# Patient Record
Sex: MALE | Race: BLACK OR AFRICAN AMERICAN | NOT HISPANIC OR LATINO | Employment: OTHER | URBAN - METROPOLITAN AREA
[De-identification: names, ages, dates, MRNs, and addresses within clinical notes are randomized per-mention and may not be internally consistent; named-entity substitution may affect disease eponyms.]

---

## 2023-07-10 ENCOUNTER — OFFICE VISIT (OUTPATIENT)
Dept: PRIMARY CARE | Facility: CLINIC | Age: 73
End: 2023-07-10
Payer: COMMERCIAL

## 2023-07-10 VITALS
HEIGHT: 67 IN | BODY MASS INDEX: 43.47 KG/M2 | SYSTOLIC BLOOD PRESSURE: 130 MMHG | OXYGEN SATURATION: 93 % | DIASTOLIC BLOOD PRESSURE: 84 MMHG | WEIGHT: 277 LBS | HEART RATE: 61 BPM

## 2023-07-10 DIAGNOSIS — I10 BENIGN ESSENTIAL HYPERTENSION: ICD-10-CM

## 2023-07-10 DIAGNOSIS — M10.00 IDIOPATHIC GOUT, UNSPECIFIED CHRONICITY, UNSPECIFIED SITE: ICD-10-CM

## 2023-07-10 DIAGNOSIS — G56.01 CARPAL TUNNEL SYNDROME OF RIGHT WRIST: Primary | ICD-10-CM

## 2023-07-10 DIAGNOSIS — E66.01 MORBID OBESITY (MULTI): ICD-10-CM

## 2023-07-10 DIAGNOSIS — E11.9 TYPE 2 DIABETES MELLITUS WITHOUT COMPLICATION, WITHOUT LONG-TERM CURRENT USE OF INSULIN (MULTI): ICD-10-CM

## 2023-07-10 PROBLEM — M19.90 OSTEOARTHROSIS: Status: ACTIVE | Noted: 2023-07-10

## 2023-07-10 PROBLEM — E78.5 HYPERLIPIDEMIA: Status: ACTIVE | Noted: 2023-07-10

## 2023-07-10 PROBLEM — M10.9 GOUT: Status: ACTIVE | Noted: 2023-07-10

## 2023-07-10 PROBLEM — G47.30 SLEEP APNEA: Status: ACTIVE | Noted: 2023-07-10

## 2023-07-10 PROCEDURE — 99213 OFFICE O/P EST LOW 20 MIN: CPT | Performed by: FAMILY MEDICINE

## 2023-07-10 PROCEDURE — 3079F DIAST BP 80-89 MM HG: CPT | Performed by: FAMILY MEDICINE

## 2023-07-10 PROCEDURE — 1036F TOBACCO NON-USER: CPT | Performed by: FAMILY MEDICINE

## 2023-07-10 PROCEDURE — 1160F RVW MEDS BY RX/DR IN RCRD: CPT | Performed by: FAMILY MEDICINE

## 2023-07-10 PROCEDURE — 3075F SYST BP GE 130 - 139MM HG: CPT | Performed by: FAMILY MEDICINE

## 2023-07-10 PROCEDURE — 1159F MED LIST DOCD IN RCRD: CPT | Performed by: FAMILY MEDICINE

## 2023-07-10 RX ORDER — LATANOPROST 50 UG/ML
1 SOLUTION/ DROPS OPHTHALMIC DAILY
COMMUNITY
Start: 2022-08-22

## 2023-07-10 RX ORDER — METFORMIN HYDROCHLORIDE EXTENDED-RELEASE TABLETS 500 MG/1
500 TABLET, FILM COATED, EXTENDED RELEASE ORAL
COMMUNITY
Start: 2022-08-22 | End: 2023-09-07 | Stop reason: DRUGHIGH

## 2023-07-10 RX ORDER — ATENOLOL 50 MG/1
1 TABLET ORAL DAILY
COMMUNITY
Start: 2022-08-22

## 2023-07-10 RX ORDER — ALLOPURINOL 100 MG/1
2 TABLET ORAL DAILY
COMMUNITY
Start: 2022-08-22

## 2023-07-10 RX ORDER — FERROUS SULFATE 324(65)MG
2 TABLET, DELAYED RELEASE (ENTERIC COATED) ORAL DAILY
COMMUNITY
Start: 2022-08-22

## 2023-07-10 RX ORDER — TRIAMTERENE/HYDROCHLOROTHIAZID 37.5-25 MG
0.5 TABLET ORAL DAILY
COMMUNITY
Start: 2022-08-22

## 2023-07-10 RX ORDER — NAPROXEN 500 MG/1
500 TABLET ORAL AS NEEDED
COMMUNITY
End: 2023-11-07 | Stop reason: WASHOUT

## 2023-07-10 RX ORDER — CARBOXYMETHYLCELLULOSE SODIUM 5 MG/ML
2 SOLUTION/ DROPS OPHTHALMIC AS NEEDED
COMMUNITY
Start: 2022-08-22

## 2023-07-10 RX ORDER — ACETAMINOPHEN 500 MG
1 TABLET ORAL DAILY
COMMUNITY
Start: 2022-08-22

## 2023-07-10 RX ORDER — SIMVASTATIN 40 MG/1
40 TABLET, FILM COATED ORAL NIGHTLY
COMMUNITY
Start: 2022-08-22

## 2023-07-10 ASSESSMENT — ENCOUNTER SYMPTOMS
VOMITING: 0
NUMBNESS: 1
CONSTIPATION: 0
DIARRHEA: 0
OCCASIONAL FEELINGS OF UNSTEADINESS: 0
ARTHRALGIAS: 0
CHILLS: 0
CHEST TIGHTNESS: 0
DIZZINESS: 0
ABDOMINAL PAIN: 0
DEPRESSION: 0
COUGH: 0
NAUSEA: 0
HEADACHES: 0
FEVER: 0
APNEA: 0
FATIGUE: 0
SHORTNESS OF BREATH: 0
ACTIVITY CHANGE: 0
ARTHRALGIAS: 1
WHEEZING: 0
LOSS OF SENSATION IN FEET: 0
WEAKNESS: 0
NERVOUS/ANXIOUS: 0
CONFUSION: 0
RESPIRATORY NEGATIVE: 1
PALPITATIONS: 0

## 2023-07-10 ASSESSMENT — PATIENT HEALTH QUESTIONNAIRE - PHQ9
SUM OF ALL RESPONSES TO PHQ9 QUESTIONS 1 AND 2: 2
10. IF YOU CHECKED OFF ANY PROBLEMS, HOW DIFFICULT HAVE THESE PROBLEMS MADE IT FOR YOU TO DO YOUR WORK, TAKE CARE OF THINGS AT HOME, OR GET ALONG WITH OTHER PEOPLE: SOMEWHAT DIFFICULT
1. LITTLE INTEREST OR PLEASURE IN DOING THINGS: SEVERAL DAYS
2. FEELING DOWN, DEPRESSED OR HOPELESS: SEVERAL DAYS

## 2023-07-10 NOTE — PROGRESS NOTES
Subjective   Patient ID: Du Herrera is a 72 y.o. male who presents for No chief complaint on file..    HPI     Review of Systems   Constitutional:  Negative for activity change, chills, fatigue and fever.   Eyes:  Negative for visual disturbance.   Respiratory: Negative.  Negative for apnea, cough and wheezing.    Cardiovascular:  Negative for chest pain and palpitations.   Gastrointestinal:  Negative for abdominal pain, constipation, diarrhea, nausea and vomiting.   Musculoskeletal:  Positive for arthralgias.   Neurological:  Negative for dizziness, weakness and headaches.   Psychiatric/Behavioral:  Negative for confusion. The patient is not nervous/anxious.        Objective   There were no vitals taken for this visit.    Physical Exam    Assessment/Plan

## 2023-07-10 NOTE — PROGRESS NOTES
"Subjective   Patient ID: Du Herrera is a 72 y.o. male who presents for No chief complaint on file..    HPI patient today for follow-up of ongoing healthcare issues.  [facility] but does not have results with him.  Also believes he has had a [facility] and will get that report to us.  He is complaining of some problems with right upper extremity weakness numbness tingling.  He is right-hand dominant.  Symptoms have been going on over the past several months.  Cannot wait pinpoint a definitive trigger.    Review of Systems   Constitutional:  Negative for chills and fever.   HENT:  Negative for congestion and ear pain.    Eyes:  Negative for visual disturbance.   Respiratory:  Negative for chest tightness and shortness of breath.    Cardiovascular:  Negative for chest pain and palpitations.   Gastrointestinal:  Negative for abdominal pain.   Musculoskeletal:  Negative for arthralgias.   Skin:  Negative for pallor.   Neurological:  Positive for numbness.   Psychiatric/Behavioral:  Negative for confusion.        Objective   /87   Pulse 61   Ht 1.702 m (5' 7\")   Wt 126 kg (277 lb)   SpO2 93%   BMI 43.38 kg/m²     Physical Exam  Vitals and nursing note reviewed.   Constitutional:       General: He is not in acute distress.     Appearance: Normal appearance. He is not ill-appearing.   HENT:      Head: Normocephalic and atraumatic.      Right Ear: Tympanic membrane, ear canal and external ear normal.      Left Ear: Tympanic membrane, ear canal and external ear normal.      Mouth/Throat:      Pharynx: Oropharynx is clear.   Eyes:      Extraocular Movements: Extraocular movements intact.   Cardiovascular:      Rate and Rhythm: Normal rate and regular rhythm.      Pulses: Normal pulses.      Heart sounds: Normal heart sounds.   Pulmonary:      Effort: Pulmonary effort is normal.      Breath sounds: Normal breath sounds.   Abdominal:      General: Abdomen " is flat. Bowel sounds are normal.      Palpations: Abdomen is soft.      Tenderness: There is no abdominal tenderness.   Musculoskeletal:         General: Normal range of motion.      Cervical back: Neck supple.      Comments: Positive Tinel sign right wrist.   Skin:     General: Skin is warm.   Neurological:      Mental Status: He is alert and oriented to person, place, and time. Mental status is at baseline.   Psychiatric:         Mood and Affect: Mood normal.     Patient to obtain copies of recent lab results as well [facility] and bring that to our office    Right upper extremity EMG further plans pending those results    Return to our office in 8 weeks to reassess his case    Assessment/Plan   Problem List Items Addressed This Visit       Benign essential hypertension     Continue current medication         Relevant Orders    Follow Up In Primary Care - Established    Gout     Continue current medication         Morbid obesity (CMS/[facility])     Reviewed dietary modifications with regards to calorie counting and sugar content as well as fat content.         Carpal tunnel syndrome of right wrist - Primary    Relevant Orders    EMG & nerve conduction    Follow Up In Primary Care - Established    Type 2 diabetes mellitus without complication, without long-term current use of insulin (CMS/HCC)     [facility] patient currently [facility]

## 2023-07-10 NOTE — ASSESSMENT & PLAN NOTE
Reviewed dietary modifications with regards to calorie counting and sugar content as well as fat content.

## 2023-07-21 DIAGNOSIS — G56.01 CARPAL TUNNEL SYNDROME OF RIGHT WRIST: ICD-10-CM

## 2023-09-07 ENCOUNTER — OFFICE VISIT (OUTPATIENT)
Dept: PRIMARY CARE | Facility: CLINIC | Age: 73
End: 2023-09-07
Payer: COMMERCIAL

## 2023-09-07 VITALS
HEART RATE: 57 BPM | OXYGEN SATURATION: 95 % | DIASTOLIC BLOOD PRESSURE: 77 MMHG | SYSTOLIC BLOOD PRESSURE: 128 MMHG | TEMPERATURE: 98.8 F | HEIGHT: 67 IN | WEIGHT: 266.4 LBS | BODY MASS INDEX: 41.81 KG/M2

## 2023-09-07 DIAGNOSIS — G56.21 ULNAR NEUROPATHY AT ELBOW, RIGHT: ICD-10-CM

## 2023-09-07 DIAGNOSIS — M54.12 CERVICAL RADICULOPATHY AT C7: ICD-10-CM

## 2023-09-07 DIAGNOSIS — E61.1 IRON DEFICIENCY: ICD-10-CM

## 2023-09-07 DIAGNOSIS — M54.12 CERVICAL RADICULOPATHY AT C8: ICD-10-CM

## 2023-09-07 DIAGNOSIS — G56.01 CARPAL TUNNEL SYNDROME OF RIGHT WRIST: ICD-10-CM

## 2023-09-07 DIAGNOSIS — E78.2 MIXED HYPERLIPIDEMIA: ICD-10-CM

## 2023-09-07 DIAGNOSIS — E11.9 TYPE 2 DIABETES MELLITUS WITHOUT COMPLICATION, WITHOUT LONG-TERM CURRENT USE OF INSULIN (MULTI): Primary | ICD-10-CM

## 2023-09-07 DIAGNOSIS — I10 BENIGN ESSENTIAL HYPERTENSION: ICD-10-CM

## 2023-09-07 PROCEDURE — 1160F RVW MEDS BY RX/DR IN RCRD: CPT | Performed by: FAMILY MEDICINE

## 2023-09-07 PROCEDURE — 1036F TOBACCO NON-USER: CPT | Performed by: FAMILY MEDICINE

## 2023-09-07 PROCEDURE — 99214 OFFICE O/P EST MOD 30 MIN: CPT | Performed by: FAMILY MEDICINE

## 2023-09-07 PROCEDURE — 1159F MED LIST DOCD IN RCRD: CPT | Performed by: FAMILY MEDICINE

## 2023-09-07 PROCEDURE — 3078F DIAST BP <80 MM HG: CPT | Performed by: FAMILY MEDICINE

## 2023-09-07 PROCEDURE — 3074F SYST BP LT 130 MM HG: CPT | Performed by: FAMILY MEDICINE

## 2023-09-07 RX ORDER — GABAPENTIN 300 MG/1
300 CAPSULE ORAL 2 TIMES DAILY
Qty: 60 CAPSULE | Refills: 2 | Status: SHIPPED | OUTPATIENT
Start: 2023-09-07 | End: 2023-11-07 | Stop reason: WASHOUT

## 2023-09-07 RX ORDER — METFORMIN HYDROCHLORIDE 850 MG/1
850 TABLET ORAL
COMMUNITY

## 2023-09-07 ASSESSMENT — ENCOUNTER SYMPTOMS
FEVER: 0
NECK PAIN: 1
SHORTNESS OF BREATH: 0
CONFUSION: 0
PALPITATIONS: 0
ABDOMINAL PAIN: 0
CHILLS: 0
CHEST TIGHTNESS: 0
ARTHRALGIAS: 0

## 2023-09-07 NOTE — ASSESSMENT & PLAN NOTE
Referral to pain management  Gabapentin 300 mg once at bedtime for the first week then increase to 1 twice a day  X-ray cervical spine

## 2023-09-07 NOTE — PROGRESS NOTES
"Subjective   Patient ID: Du Herrera is a 73 y.o. male who presents for Follow-up (2 month follow up test and discuss right shoulder pain ).    HPI   Patient today for follow-up on ongoing healthcare issues he brings in copies of [facility] as well as his [facility]oscopy.  Also he completed his EMG over the summer and here to review results.  He continues have problems with pain numbness and tingling of varying degrees in the right upper extremity as well as discomfort in the right posterior neck region.  Review of Systems   Constitutional:  Negative for chills and fever.   HENT:  Negative for congestion and ear pain.    Eyes:  Negative for visual disturbance.   Respiratory:  Negative for chest tightness and shortness of breath.    Cardiovascular:  Negative for chest pain and palpitations.   Gastrointestinal:  Negative for abdominal pain.   Musculoskeletal:  Positive for neck pain. Negative for arthralgias.        Right upper extremity numbness tingling and pain   Skin:  Negative for pallor.   Psychiatric/Behavioral:  Negative for confusion.        Objective   /77 (BP Location: Left arm, Patient Position: Sitting)   Pulse 57   Temp 37.1 °C (98.8 °F)   Ht 1.702 m (5' 7\")   Wt 121 kg (266 lb 6.4 oz)   SpO2 95%   BMI 41.72 kg/m²     Physical Exam  Vitals and nursing note reviewed.   Constitutional:       General: He is not in acute distress.     Appearance: Normal appearance. He is not ill-appearing.   HENT:      Head: Normocephalic and atraumatic.      Right Ear: Tympanic membrane, ear canal and external ear normal.      Left Ear: Tympanic membrane, ear canal and external ear normal.      Mouth/Throat:      Pharynx: Oropharynx is clear.   Eyes:      Extraocular Movements: Extraocular movements intact.   Cardiovascular:      Rate and Rhythm: Normal rate and regular rhythm.      Pulses: Normal pulses.      Heart sounds: Normal heart sounds.   Pulmonary:      Effort: Pulmonary effort is normal.     "  Breath sounds: Normal breath sounds.   Abdominal:      General: Abdomen is flat. Bowel sounds are normal.      Palpations: Abdomen is soft.      Tenderness: There is no abdominal tenderness.   Musculoskeletal:         General: Tenderness present. Normal range of motion.      Cervical back: Neck supple.      Comments: Positive tenderness of the paracervical muscles especially on the right side of the neck and into the right trapezius.  Positive Tinel sign at the level of the right wrist as well as the medial right elbow   Skin:     General: Skin is warm.   Neurological:      Mental Status: He is alert and oriented to person, place, and time. Mental status is at baseline.      Motor: No weakness.   Psychiatric:         Mood and Affect: Mood normal.     EMG reviewed with the patient showing right-sided carpal tunnel syndrome right sided ulnar neuropathy.  Also evidence of C7 and C8 cervical radiculopathy  Refer to orthopedics for second opinion on the carpal tunnel and the ulnar neuropathy issues    Refer to pain management  X-ray cervical spine  Trial of gabapentin 300 mg once at bedtime the first week then increase to 1 twice daily  Await further input from specialist diagnostic test and clinical course with regards to decision-making on the cervical radiculopathy    We discussed recommendations with regards to immunizations heading into the fall specifically flu vaccine COVID booster and RSV    Colonoscopy report reviewed with patient he will be due for repeat scope in 5 years from his last scope which would place him into February 2026  Return to office 8 weeks for reassessment    Assessment/Plan   Problem List Items Addressed This Visit       Benign essential hypertension     Stable continue treatment         Relevant Orders    Follow Up In Primary Care - Established    Hyperlipidemia     Stable on recent labs continue Zocor and dietary modifications         Carpal tunnel syndrome of right wrist     Refer to  orthopedics for second opinion on possible surgical intervention         Relevant Orders    Referral to Orthopaedic Surgery    Follow Up In Primary Care - Established    Type 2 diabetes mellitus without complication, without long-term current use of insulin (CMS/[facility]) - Primary     A1c 6.4% patient states [facility] has him on [facility] 850 mg once a day         Ulnar neuropathy at elbow, right     Referral to orthopedics for second opinion on surgical intervention         Relevant Orders    Referral to Orthopaedic Surgery    Follow Up In Primary Care - Established    Cervical radiculopathy at C7     Referral to pain management  Gabapentin 300 mg once at bedtime for the first week then increase to 1 twice a day  X-ray cervical spine         Relevant Medications    gabapentin (Neurontin) 300 mg capsule    Other Relevant Orders    XR cervical spine 2-3 views    Referral to Pain Medicine    Follow Up In Primary Care - Established    Cervical radiculopathy at C8     X-ray cervical spine  Gabapentin 300 mg as directed  Pain management referral         Relevant Medications    gabapentin (Neurontin) 300 mg capsule    Other Relevant Orders    XR cervical spine 2-3 views    Referral to Pain Medicine    Follow Up In Primary Care - Established    Iron deficiency     Level stable continue to follow with [facility] and monitor

## 2023-09-28 ENCOUNTER — HOSPITAL ENCOUNTER (OUTPATIENT)
Facility: HOSPITAL | Age: 73
Setting detail: OUTPATIENT SURGERY
End: 2023-09-28
Attending: ORTHOPAEDIC SURGERY | Admitting: ORTHOPAEDIC SURGERY
Payer: COMMERCIAL

## 2023-11-07 ENCOUNTER — OFFICE VISIT (OUTPATIENT)
Dept: PRIMARY CARE | Facility: CLINIC | Age: 73
End: 2023-11-07
Payer: MEDICARE

## 2023-11-07 VITALS
SYSTOLIC BLOOD PRESSURE: 121 MMHG | DIASTOLIC BLOOD PRESSURE: 77 MMHG | TEMPERATURE: 96.9 F | HEART RATE: 65 BPM | WEIGHT: 264 LBS | BODY MASS INDEX: 41.35 KG/M2 | RESPIRATION RATE: 14 BRPM | OXYGEN SATURATION: 96 %

## 2023-11-07 DIAGNOSIS — D57.3 SICKLE CELL TRAIT (CMS-HCC): ICD-10-CM

## 2023-11-07 DIAGNOSIS — M54.12 CERVICAL RADICULOPATHY AT C8: ICD-10-CM

## 2023-11-07 DIAGNOSIS — I10 BENIGN ESSENTIAL HYPERTENSION: ICD-10-CM

## 2023-11-07 DIAGNOSIS — G56.01 CARPAL TUNNEL SYNDROME OF RIGHT WRIST: Primary | ICD-10-CM

## 2023-11-07 DIAGNOSIS — H61.21 IMPACTED CERUMEN OF RIGHT EAR: ICD-10-CM

## 2023-11-07 DIAGNOSIS — M54.12 CERVICAL RADICULOPATHY AT C7: ICD-10-CM

## 2023-11-07 PROBLEM — G56.21 CUBITAL TUNNEL SYNDROME ON RIGHT: Status: ACTIVE | Noted: 2023-11-07

## 2023-11-07 PROCEDURE — 3078F DIAST BP <80 MM HG: CPT | Performed by: FAMILY MEDICINE

## 2023-11-07 PROCEDURE — 1159F MED LIST DOCD IN RCRD: CPT | Performed by: FAMILY MEDICINE

## 2023-11-07 PROCEDURE — 99214 OFFICE O/P EST MOD 30 MIN: CPT | Performed by: FAMILY MEDICINE

## 2023-11-07 PROCEDURE — 1160F RVW MEDS BY RX/DR IN RCRD: CPT | Performed by: FAMILY MEDICINE

## 2023-11-07 PROCEDURE — 69209 REMOVE IMPACTED EAR WAX UNI: CPT | Performed by: FAMILY MEDICINE

## 2023-11-07 PROCEDURE — 1036F TOBACCO NON-USER: CPT | Performed by: FAMILY MEDICINE

## 2023-11-07 PROCEDURE — 3074F SYST BP LT 130 MM HG: CPT | Performed by: FAMILY MEDICINE

## 2023-11-07 RX ORDER — CLOTRIMAZOLE 1 G/ML
SOLUTION TOPICAL
COMMUNITY
Start: 2023-07-05

## 2023-11-07 ASSESSMENT — ENCOUNTER SYMPTOMS
PALPITATIONS: 0
CHEST TIGHTNESS: 0
CONFUSION: 0
CHILLS: 0
ABDOMINAL PAIN: 0
FEVER: 0
ARTHRALGIAS: 0
SHORTNESS OF BREATH: 0

## 2023-11-07 NOTE — ASSESSMENT & PLAN NOTE
Ortho recommended surgery patient is holding off till he completes [facility].  He understands the potential for putting surgery off too long can lead to permanent weakness in his hand and upper extremity.

## 2023-11-07 NOTE — PROGRESS NOTES
Subjective   Patient ID: Du Herrera is a 73 y.o. male who presents for Follow-up (8 week B/P ).    HPI patient today for follow-up he saw orthopedics they are recommending carpal tunnel surgery as well as cubital tunnel surgery.  Patient also went on to see [facility] patient states they did another EMG and an MRI of his cervical spine.  They have him set up to see a cervical spine surgeon beginning of December.  For now patient wants to wait and see how that up evaluation goes before proceeding with surgical intervention.  He asked understands the potential that if carpal tunnel and cubital tunnel surgery was put off too long it can lead to permanent deficits in his extremity including weakness pain numbness and tingling.  Review of Systems   Constitutional:  Negative for chills and fever.   HENT:  Negative for congestion and ear pain.    Eyes:  Negative for visual disturbance.   Respiratory:  Negative for chest tightness and shortness of breath.    Cardiovascular:  Negative for chest pain and palpitations.   Gastrointestinal:  Negative for abdominal pain.   Musculoskeletal:  Negative for arthralgias.   Skin:  Negative for pallor.   Psychiatric/Behavioral:  Negative for confusion.        Objective   /77   Pulse 65   Temp 36.1 °C (96.9 °F)   Resp 14   Wt 120 kg (264 lb)   SpO2 96%   BMI 41.35 kg/m²     Physical Exam  Vitals and nursing note reviewed.   Constitutional:       General: He is not in acute distress.     Appearance: Normal appearance. He is not ill-appearing.   HENT:      Head: Normocephalic and atraumatic.      Right Ear: Tympanic membrane, ear canal and external ear normal.      Left Ear: Tympanic membrane, ear canal and external ear normal.      Mouth/Throat:      Pharynx: Oropharynx is clear.   Eyes:      Extraocular Movements: Extraocular movements intact.   Cardiovascular:      Rate and Rhythm: Normal rate and regular rhythm.      Pulses: Normal pulses.      Heart sounds: Normal  heart sounds.   Pulmonary:      Effort: Pulmonary effort is normal.      Breath sounds: Normal breath sounds.   Abdominal:      General: Abdomen is flat. Bowel sounds are normal.      Palpations: Abdomen is soft.      Tenderness: There is no abdominal tenderness.   Musculoskeletal:         General: Normal range of motion.      Cervical back: Neck supple.      Comments: Positive Tinel's sign   Skin:     General: Skin is warm.   Neurological:      Mental Status: He is alert and oriented to person, place, and time. Mental status is at baseline.   Psychiatric:         Mood and Affect: Mood normal.     orthopedic report reviewed with patient    Cerumen impaction right ear lavage.    Patient to get copy of his MRI and EMG [facility] the [facility] and bring those reports to next appointment    Further plans pending his [facility] evaluation.  Call if he has any questions or concerns or if he decides he wants to start any surgical interventions.  Otherwise return to our office in 8 weeks to reassess his case  Also discussed recommendations with regards to RSV vaccine  Assessment/Plan   Problem List Items Addressed This Visit             ICD-10-CM    Benign essential hypertension I10     Stable continue current treatment         Relevant Orders    Follow Up In Primary Care - Established    Carpal tunnel syndrome of right wrist - Primary G56.01     Ortho recommended surgery patient is holding off till he completes his evaluation [facility] the [facility].  He understands the potential for putting surgery off too long can lead to permanent weakness in his hand and upper extremity.         Cervical radiculopathy at C7 M54.12     Patient states he had repeat EMG and an MRI of the cervical spine through the [facility] is seeing a cervical spine surgeon beginning of December through the [facility] he is can await their input before making final decisions regards to treatment plan and carpal tunnel and cubital tunnel surgeries that were recommended by Ortho.         Cervical  radiculopathy at C8 M54.12     Follow through with [facility] specialist         Impacted cerumen of right ear H61.21     Ear lavage         Relevant Orders    Ear Cerumen Removal    Sickle cell trait (CMS/HCC) D57.3     Per history of chart clinically stable

## 2023-11-07 NOTE — PROGRESS NOTES
Patient ID: Du Herrera is a 73 y.o. male.    Ear Cerumen Removal    Date/Time: 11/7/2023 3:10 PM    Performed by: Yaa Biswas LPN  Authorized by: Ede Raygoza MD    Consent:     Consent obtained:  Verbal    Consent given by:  Patient    Risks, benefits, and alternatives were discussed: yes      Risks discussed:  Bleeding, infection, pain, dizziness and incomplete removal    Alternatives discussed:  No treatment, delayed treatment and alternative treatment  Universal protocol:     Procedure explained and questions answered to patient or proxy's satisfaction: yes      Patient identity confirmed:  Verbally with patient  Procedure details:     Location:  R ear    Procedure type: irrigation      Procedure outcomes: cerumen removed    Post-procedure details:     Inspection:  Ear canal clear    Hearing quality:  Normal    Procedure completion:  Tolerated  Comments:      Dizziness post procedure

## 2023-11-07 NOTE — ASSESSMENT & PLAN NOTE
Patient states he had repeat EMG and an MRI of the cervical spine [facility] is seeing a cervical spine surgeon beginning of December through the [facility] he is can await their input before making final decisions regards to treatment plan and carpal tunnel and cubital tunnel surgeries that were recommended by Ortho.

## 2024-01-02 ENCOUNTER — OFFICE VISIT (OUTPATIENT)
Dept: PRIMARY CARE | Facility: CLINIC | Age: 74
End: 2024-01-02
Payer: MEDICARE

## 2024-01-02 VITALS
RESPIRATION RATE: 14 BRPM | TEMPERATURE: 96.3 F | WEIGHT: 265.4 LBS | SYSTOLIC BLOOD PRESSURE: 120 MMHG | HEIGHT: 68 IN | HEART RATE: 61 BPM | BODY MASS INDEX: 40.22 KG/M2 | OXYGEN SATURATION: 94 % | DIASTOLIC BLOOD PRESSURE: 73 MMHG

## 2024-01-02 DIAGNOSIS — Z00.00 ANNUAL PHYSICAL EXAM: ICD-10-CM

## 2024-01-02 DIAGNOSIS — M54.12 CERVICAL RADICULOPATHY AT C7: ICD-10-CM

## 2024-01-02 DIAGNOSIS — I10 BENIGN ESSENTIAL HYPERTENSION: ICD-10-CM

## 2024-01-02 DIAGNOSIS — E66.01 MORBID OBESITY WITH BMI OF 40.0-44.9, ADULT (MULTI): ICD-10-CM

## 2024-01-02 DIAGNOSIS — M54.12 CERVICAL RADICULOPATHY AT C8: ICD-10-CM

## 2024-01-02 DIAGNOSIS — Z00.00 MEDICARE ANNUAL WELLNESS VISIT, SUBSEQUENT: Primary | ICD-10-CM

## 2024-01-02 DIAGNOSIS — G56.01 CARPAL TUNNEL SYNDROME OF RIGHT WRIST: ICD-10-CM

## 2024-01-02 DIAGNOSIS — D57.3 SICKLE CELL TRAIT (CMS-HCC): ICD-10-CM

## 2024-01-02 PROBLEM — E11.9 TYPE 2 DIABETES MELLITUS WITHOUT COMPLICATION, WITHOUT LONG-TERM CURRENT USE OF INSULIN (MULTI): Status: RESOLVED | Noted: 2023-07-10 | Resolved: 2024-01-02

## 2024-01-02 PROBLEM — H61.21 IMPACTED CERUMEN OF RIGHT EAR: Status: RESOLVED | Noted: 2023-11-07 | Resolved: 2024-01-02

## 2024-01-02 PROCEDURE — 3074F SYST BP LT 130 MM HG: CPT | Performed by: FAMILY MEDICINE

## 2024-01-02 PROCEDURE — 99213 OFFICE O/P EST LOW 20 MIN: CPT | Performed by: FAMILY MEDICINE

## 2024-01-02 PROCEDURE — 1123F ACP DISCUSS/DSCN MKR DOCD: CPT | Performed by: FAMILY MEDICINE

## 2024-01-02 PROCEDURE — 1170F FXNL STATUS ASSESSED: CPT | Performed by: FAMILY MEDICINE

## 2024-01-02 PROCEDURE — 1160F RVW MEDS BY RX/DR IN RCRD: CPT | Performed by: FAMILY MEDICINE

## 2024-01-02 PROCEDURE — 3078F DIAST BP <80 MM HG: CPT | Performed by: FAMILY MEDICINE

## 2024-01-02 PROCEDURE — 1036F TOBACCO NON-USER: CPT | Performed by: FAMILY MEDICINE

## 2024-01-02 PROCEDURE — 3008F BODY MASS INDEX DOCD: CPT | Performed by: FAMILY MEDICINE

## 2024-01-02 PROCEDURE — G0439 PPPS, SUBSEQ VISIT: HCPCS | Performed by: FAMILY MEDICINE

## 2024-01-02 PROCEDURE — 1159F MED LIST DOCD IN RCRD: CPT | Performed by: FAMILY MEDICINE

## 2024-01-02 ASSESSMENT — ACTIVITIES OF DAILY LIVING (ADL)
GROCERY_SHOPPING: INDEPENDENT
MANAGING_FINANCES: INDEPENDENT
TAKING_MEDICATION: INDEPENDENT
BATHING: INDEPENDENT
DOING_HOUSEWORK: INDEPENDENT
DRESSING: INDEPENDENT

## 2024-01-02 ASSESSMENT — ENCOUNTER SYMPTOMS
SHORTNESS OF BREATH: 0
CHILLS: 0
PALPITATIONS: 0
CONFUSION: 0
FEVER: 0
ARTHRALGIAS: 1
CHEST TIGHTNESS: 0
LOSS OF SENSATION IN FEET: 0
OCCASIONAL FEELINGS OF UNSTEADINESS: 0
ABDOMINAL PAIN: 0
DEPRESSION: 0
NECK PAIN: 1

## 2024-01-02 ASSESSMENT — PATIENT HEALTH QUESTIONNAIRE - PHQ9
10. IF YOU CHECKED OFF ANY PROBLEMS, HOW DIFFICULT HAVE THESE PROBLEMS MADE IT FOR YOU TO DO YOUR WORK, TAKE CARE OF THINGS AT HOME, OR GET ALONG WITH OTHER PEOPLE: NOT DIFFICULT AT ALL
1. LITTLE INTEREST OR PLEASURE IN DOING THINGS: SEVERAL DAYS
SUM OF ALL RESPONSES TO PHQ9 QUESTIONS 1 AND 2: 2
2. FEELING DOWN, DEPRESSED OR HOPELESS: SEVERAL DAYS

## 2024-01-02 NOTE — ASSESSMENT & PLAN NOTE
Reviewed recommendations with regards to RSV vaccine he states he will consider other vaccines are up-to-date.

## 2024-01-02 NOTE — PROGRESS NOTES
"Subjective   Reason for Visit: Du Herrera is an 73 y.o. male here for a Medicare Wellness visit.     Past Medical, Surgical, and Family History reviewed and updated in chart.    Reviewed all medications by prescribing practitioner or clinical pharmacist (such as prescriptions, OTCs, herbal therapies and supplements) and documented in the medical record.    HPI  Patient today for follow-up of ongoing healthcare issues.  Says he saw orthopedic specialist they do not feel surgery is warranted for carpal tunnel at this time.  Also [facility] says he saw cervical spine surgeon they do not feel surgery is warranted at this time.  Symptomatically says he is doing better with his upper extremity symptoms.  He says the [facility] may have him see someone from pain management.  Patient says he is no longer taking the gabapentin.  Patient Care Team:  Ede Raygoza MD as PCP - General     Review of Systems   Constitutional:  Negative for chills and fever.   HENT:  Negative for congestion and ear pain.    Eyes:  Negative for visual disturbance.   Respiratory:  Negative for chest tightness and shortness of breath.    Cardiovascular:  Negative for chest pain and palpitations.   Gastrointestinal:  Negative for abdominal pain.   Musculoskeletal:  Positive for arthralgias and neck pain.   Skin:  Negative for pallor.   Psychiatric/Behavioral:  Negative for confusion.        Objective   Vitals:  /73   Pulse 61   Temp 35.7 °C (96.3 °F)   Resp 14   Ht 1.727 m (5' 8\")   Wt 120 kg (265 lb 6.4 oz)   SpO2 94%   BMI 40.35 kg/m²       Physical Exam  Vitals and nursing note reviewed.   Constitutional:       General: He is not in acute distress.     Appearance: Normal appearance. He is not ill-appearing.   HENT:      Head: Normocephalic and atraumatic.      Right Ear: Tympanic membrane, ear canal and external ear normal.      Left Ear: Tympanic membrane, ear canal and external ear normal.      Mouth/Throat:      Pharynx: Oropharynx " is clear.   Eyes:      Extraocular Movements: Extraocular movements intact.   Cardiovascular:      Rate and Rhythm: Normal rate and regular rhythm.      Pulses: Normal pulses.      Heart sounds: Normal heart sounds.   Pulmonary:      Effort: Pulmonary effort is normal.      Breath sounds: Normal breath sounds.   Abdominal:      General: Abdomen is flat. Bowel sounds are normal.      Palpations: Abdomen is soft.      Tenderness: There is no abdominal tenderness.   Musculoskeletal:         General: Normal range of motion.      Cervical back: Neck supple.   Skin:     General: Skin is warm.   Neurological:      Mental Status: He is alert and oriented to person, place, and time. Mental status is at baseline.   Psychiatric:         Mood and Affect: Mood normal.     [facility] will [facility] with the [facility] he has an appointment coming up in the next month or so he is to get copy of his blood work results from the [facility] and bring it to his next office appointment with us.    return to our office in 4 months to reassess his case and ongoing healthcare issues.    Assessment/Plan   Problem List Items Addressed This Visit       Benign essential hypertension    Current Assessment & Plan     Stable continue current treatment         Relevant Orders    Follow Up In Primary Care - Established    Carpal tunnel syndrome of right wrist    Current Assessment & Plan     Clinically stable he states specialist does not feel surgery is warranted at this time.         Cervical radiculopathy at C7    Current Assessment & Plan     Patient states specialist does not feel surgery is warranted at this time he continues to work with the [facility] may be considering pain management referral.         Cervical radiculopathy at C8    Current Assessment & Plan     Clinically stable patient states did not feel surgical intervention is warranted at this time         Sickle cell trait (CMS/HCC)    Current Assessment & Plan     Clinically stable         Morbid  obesity with BMI of 40.0-44.9, adult (CMS/[facility])    Current Assessment & Plan     Continue to work on lifestyle modifications with regards to diet.         Relevant Orders    Follow Up In Primary Care - Established    Medicare annual wellness visit, subsequent - Primary    Current Assessment & Plan     Reviewed recommendations with regards to RSV vaccine he states he will consider other vaccines are up-to-date.         Annual physical exam    Current Assessment & Plan     Continue to work on lifestyle modifications in effort to lose weight.  He also will continue to follow with the [facility] for ongoing healthcare issues.

## 2024-01-02 NOTE — ASSESSMENT & PLAN NOTE
Patient states specialist does not feel surgery is warranted at this time he continues to work with the [facility] may be considering pain management referral.

## 2024-01-02 NOTE — ASSESSMENT & PLAN NOTE
Continue to work on lifestyle modifications in effort to lose weight.  He also will continue to follow with the [facility] for ongoing healthcare issues.

## 2024-05-07 ENCOUNTER — OFFICE VISIT (OUTPATIENT)
Dept: PRIMARY CARE | Facility: CLINIC | Age: 74
End: 2024-05-07
Payer: MEDICARE

## 2024-05-07 VITALS
TEMPERATURE: 97.8 F | OXYGEN SATURATION: 94 % | BODY MASS INDEX: 41.66 KG/M2 | RESPIRATION RATE: 14 BRPM | SYSTOLIC BLOOD PRESSURE: 121 MMHG | WEIGHT: 274 LBS | DIASTOLIC BLOOD PRESSURE: 74 MMHG | HEART RATE: 59 BPM

## 2024-05-07 DIAGNOSIS — E78.2 MIXED HYPERLIPIDEMIA: ICD-10-CM

## 2024-05-07 DIAGNOSIS — M10.00 IDIOPATHIC GOUT, UNSPECIFIED CHRONICITY, UNSPECIFIED SITE: ICD-10-CM

## 2024-05-07 DIAGNOSIS — I10 BENIGN ESSENTIAL HYPERTENSION: Primary | ICD-10-CM

## 2024-05-07 DIAGNOSIS — H60.502 ACUTE OTITIS EXTERNA OF LEFT EAR, UNSPECIFIED TYPE: ICD-10-CM

## 2024-05-07 DIAGNOSIS — E66.01 MORBID OBESITY WITH BMI OF 40.0-44.9, ADULT (MULTI): ICD-10-CM

## 2024-05-07 PROCEDURE — 3074F SYST BP LT 130 MM HG: CPT | Performed by: FAMILY MEDICINE

## 2024-05-07 PROCEDURE — 1160F RVW MEDS BY RX/DR IN RCRD: CPT | Performed by: FAMILY MEDICINE

## 2024-05-07 PROCEDURE — 3078F DIAST BP <80 MM HG: CPT | Performed by: FAMILY MEDICINE

## 2024-05-07 PROCEDURE — 99213 OFFICE O/P EST LOW 20 MIN: CPT | Performed by: FAMILY MEDICINE

## 2024-05-07 PROCEDURE — 1123F ACP DISCUSS/DSCN MKR DOCD: CPT | Performed by: FAMILY MEDICINE

## 2024-05-07 PROCEDURE — 3008F BODY MASS INDEX DOCD: CPT | Performed by: FAMILY MEDICINE

## 2024-05-07 PROCEDURE — 1036F TOBACCO NON-USER: CPT | Performed by: FAMILY MEDICINE

## 2024-05-07 PROCEDURE — 1159F MED LIST DOCD IN RCRD: CPT | Performed by: FAMILY MEDICINE

## 2024-05-07 RX ORDER — OFLOXACIN 3 MG/ML
5 SOLUTION AURICULAR (OTIC) 2 TIMES DAILY
Qty: 10 ML | Refills: 0 | Status: SHIPPED | OUTPATIENT
Start: 2024-05-07 | End: 2024-05-14

## 2024-05-07 RX ORDER — LANOLIN ALCOHOL/MO/W.PET/CERES
1000 CREAM (GRAM) TOPICAL DAILY
COMMUNITY
Start: 2024-03-07

## 2024-05-07 ASSESSMENT — ENCOUNTER SYMPTOMS
CHILLS: 0
FEVER: 0
SHORTNESS OF BREATH: 0
CHEST TIGHTNESS: 0
CONFUSION: 0
ARTHRALGIAS: 0
PALPITATIONS: 0
ABDOMINAL PAIN: 0

## 2024-05-07 NOTE — ASSESSMENT & PLAN NOTE
Patient on simvastatin 40 mg daily work on dietary modification.  He states he has blood work done through the [facility] and he will provide us with results.

## 2024-05-07 NOTE — PROGRESS NOTES
Subjective   Patient ID: Du Herrera is a 73 y.o. male who presents for Follow-up (4 month).    HPI patient today for follow-up of ongoing healthcare issues.  [facility]  He did not bring his any lab work results although he says he had labs updated recently.  He is advised that he needs to bring us copies of those [facility] lab work results we can update his record and to better help manage his ongoing healthcare needs.  Otherwise we can order additional lab work and have them done in the private sector at this point he is declining to do that.  Says for the most part has been feeling good denies any major new acute complaints.    Review of Systems   Constitutional:  Negative for chills and fever.   HENT:  Negative for congestion and ear pain.    Eyes:  Negative for visual disturbance.   Respiratory:  Negative for chest tightness and shortness of breath.    Cardiovascular:  Negative for chest pain and palpitations.   Gastrointestinal:  Negative for abdominal pain.   Musculoskeletal:  Negative for arthralgias.   Skin:  Negative for pallor.   Psychiatric/Behavioral:  Negative for confusion.        Objective   /74   Pulse 59   Temp 36.6 °C (97.8 °F)   Resp 14   Wt 124 kg (274 lb)   SpO2 94%   BMI 41.66 kg/m²     Physical Exam  Vitals and nursing note reviewed.   Constitutional:       General: He is not in acute distress.     Appearance: Normal appearance. He is not ill-appearing.   HENT:      Head: Normocephalic and atraumatic.      Right Ear: Tympanic membrane, ear canal and external ear normal.      Left Ear: Tympanic membrane, ear canal and external ear normal.      Mouth/Throat:      Pharynx: Oropharynx is clear.   Eyes:      Extraocular Movements: Extraocular movements intact.   Cardiovascular:      Rate and Rhythm: Normal rate and regular rhythm.      Pulses: Normal pulses.      Heart sounds: Normal heart sounds.   Pulmonary:      Effort: Pulmonary effort is  normal.      Breath sounds: Normal breath sounds.   Abdominal:      General: Abdomen is flat. Bowel sounds are normal.      Palpations: Abdomen is soft.      Tenderness: There is no abdominal tenderness.   Musculoskeletal:         General: Normal range of motion.      Cervical back: Neck supple.   Skin:     General: Skin is warm.   Neurological:      Mental Status: He is alert and oriented to person, place, and time. Mental status is at baseline.   Psychiatric:         Mood and Affect: Mood normal.     For now continue current medications advised him to please provide us with copies of blood work that he has [facility] so we can update his medical record.  He is return to our office in approximately 4 to 5 months to reassess his case    Assessment/Plan   Problem List Items Addressed This Visit             ICD-10-CM    Benign essential hypertension - Primary I10     Stable continue current treatment         Gout M10.9     No recent acute attacks reported continue allopurinol         Hyperlipidemia E78.5     Patient on simvastatin 40 mg daily work on dietary modification.  He states he [facility] and he will provide us with results.         Morbid obesity with BMI of 40.0-44.9, adult (Multi) E66.01, Z68.41     Reviewed diet exercise weight loss strategies         Acute otitis externa of left ear H60.502     Floxin otic as directed call if any questions or concerns return in 10 to 14 days if no better         Relevant Medications    ofloxacin (Floxin) 0.3 % otic solution

## 2024-09-10 ENCOUNTER — APPOINTMENT (OUTPATIENT)
Dept: PRIMARY CARE | Facility: CLINIC | Age: 74
End: 2024-09-10
Payer: MEDICARE

## 2024-10-02 ENCOUNTER — APPOINTMENT (OUTPATIENT)
Dept: PRIMARY CARE | Facility: CLINIC | Age: 74
End: 2024-10-02
Payer: MEDICARE

## 2024-10-03 ENCOUNTER — OFFICE VISIT (OUTPATIENT)
Dept: PRIMARY CARE | Facility: CLINIC | Age: 74
End: 2024-10-03
Payer: MEDICARE

## 2024-10-03 VITALS
SYSTOLIC BLOOD PRESSURE: 112 MMHG | OXYGEN SATURATION: 95 % | HEART RATE: 64 BPM | DIASTOLIC BLOOD PRESSURE: 74 MMHG | TEMPERATURE: 97.1 F | WEIGHT: 278 LBS | RESPIRATION RATE: 14 BRPM | BODY MASS INDEX: 42.27 KG/M2

## 2024-10-03 DIAGNOSIS — M10.00 IDIOPATHIC GOUT, UNSPECIFIED CHRONICITY, UNSPECIFIED SITE: ICD-10-CM

## 2024-10-03 DIAGNOSIS — I10 BENIGN ESSENTIAL HYPERTENSION: ICD-10-CM

## 2024-10-03 DIAGNOSIS — E11.9 TYPE 2 DIABETES MELLITUS WITHOUT COMPLICATION, WITHOUT LONG-TERM CURRENT USE OF INSULIN (MULTI): Primary | ICD-10-CM

## 2024-10-03 DIAGNOSIS — Z12.5 SCREENING FOR PROSTATE CANCER: ICD-10-CM

## 2024-10-03 DIAGNOSIS — E61.1 IRON DEFICIENCY: ICD-10-CM

## 2024-10-03 DIAGNOSIS — Z23 ENCOUNTER FOR IMMUNIZATION: ICD-10-CM

## 2024-10-03 DIAGNOSIS — E78.2 MIXED HYPERLIPIDEMIA: ICD-10-CM

## 2024-10-03 DIAGNOSIS — E66.01 MORBID OBESITY WITH BMI OF 40.0-44.9, ADULT (MULTI): ICD-10-CM

## 2024-10-03 DIAGNOSIS — E55.9 VITAMIN D DEFICIENCY: ICD-10-CM

## 2024-10-03 DIAGNOSIS — Z13.29 THYROID DISORDER SCREEN: ICD-10-CM

## 2024-10-03 PROBLEM — H60.502 ACUTE OTITIS EXTERNA OF LEFT EAR: Status: RESOLVED | Noted: 2024-05-07 | Resolved: 2024-10-03

## 2024-10-03 PROCEDURE — 1159F MED LIST DOCD IN RCRD: CPT | Performed by: FAMILY MEDICINE

## 2024-10-03 PROCEDURE — 99214 OFFICE O/P EST MOD 30 MIN: CPT | Performed by: FAMILY MEDICINE

## 2024-10-03 PROCEDURE — 1036F TOBACCO NON-USER: CPT | Performed by: FAMILY MEDICINE

## 2024-10-03 PROCEDURE — 3078F DIAST BP <80 MM HG: CPT | Performed by: FAMILY MEDICINE

## 2024-10-03 PROCEDURE — 3074F SYST BP LT 130 MM HG: CPT | Performed by: FAMILY MEDICINE

## 2024-10-03 PROCEDURE — 1160F RVW MEDS BY RX/DR IN RCRD: CPT | Performed by: FAMILY MEDICINE

## 2024-10-03 PROCEDURE — 1123F ACP DISCUSS/DSCN MKR DOCD: CPT | Performed by: FAMILY MEDICINE

## 2024-10-03 PROCEDURE — 90662 IIV NO PRSV INCREASED AG IM: CPT | Performed by: FAMILY MEDICINE

## 2024-10-03 PROCEDURE — G2211 COMPLEX E/M VISIT ADD ON: HCPCS | Performed by: FAMILY MEDICINE

## 2024-10-03 PROCEDURE — G0008 ADMIN INFLUENZA VIRUS VAC: HCPCS | Performed by: FAMILY MEDICINE

## 2024-10-03 ASSESSMENT — ENCOUNTER SYMPTOMS
ARTHRALGIAS: 0
PALPITATIONS: 0
ABDOMINAL PAIN: 0
CHEST TIGHTNESS: 0
CONFUSION: 0
SHORTNESS OF BREATH: 0
FEVER: 0
CHILLS: 0

## 2024-10-03 NOTE — PROGRESS NOTES
Subjective   Patient ID: Du Herrera is a 74 y.o. male who presents for Follow-up (4 month).    HPI patient today for follow-up of ongoing healthcare issues.  Last [facility] work he had [facility] several months ago.  He admits he has not been keeping up with his primary care follow-ups through the [facility].  He also says he like to try to lose weight he is watched somewhat but he admits he has not really done a consistent strict diet and really any formal exercise.    Review of Systems   Constitutional:  Negative for chills and fever.   HENT:  Negative for congestion and ear pain.    Eyes:  Negative for visual disturbance.   Respiratory:  Negative for chest tightness and shortness of breath.    Cardiovascular:  Negative for chest pain and palpitations.   Gastrointestinal:  Negative for abdominal pain.   Musculoskeletal:  Negative for arthralgias.   Skin:  Negative for pallor.   Psychiatric/Behavioral:  Negative for confusion.        Objective   /74   Pulse 64   Temp 36.2 °C (97.1 °F)   Resp 14   Wt 126 kg (278 lb)   SpO2 95%   BMI 42.27 kg/m²     Physical Exam  Vitals and nursing note reviewed.   Constitutional:       General: He is not in acute distress.     Appearance: Normal appearance. He is not ill-appearing.   HENT:      Head: Normocephalic and atraumatic.      Right Ear: Tympanic membrane, ear canal and external ear normal.      Left Ear: Tympanic membrane, ear canal and external ear normal.      Mouth/Throat:      Pharynx: Oropharynx is clear.   Eyes:      Extraocular Movements: Extraocular movements intact.   Cardiovascular:      Rate and Rhythm: Normal rate and regular rhythm.      Pulses: Normal pulses.      Heart sounds: Normal heart sounds.   Pulmonary:      Effort: Pulmonary effort is normal.      Breath sounds: Normal breath sounds.   Abdominal:      General: Abdomen is flat. Bowel sounds are normal.      Palpations: Abdomen is soft.      Tenderness: There is no abdominal tenderness.    Musculoskeletal:         General: Normal range of motion.      Cervical back: Neck supple.   Skin:     General: Skin is warm.   Neurological:      Mental Status: He is alert and oriented to person, place, and time. Mental status is at baseline.   Psychiatric:         Mood and Affect: Mood normal.     [facility] were reviewed with the patient for now continue current medications work on lifestyle modifications with regards to diet and exercise    High-dose flu shot x 1    And return to our office in 4 months with repeat fasting labs    Assessment/Plan   Problem List Items Addressed This Visit             ICD-10-CM    Benign essential hypertension I10     Stable continue current treatment         Relevant Orders    CBC    Comprehensive Metabolic Panel    Follow Up In Primary Care - Established    Gout M10.9     Stable continue allopurinol check labs         Relevant Orders    Follow Up In Primary Care - Established    Uric Acid    Hyperlipidemia E78.5     Update fasting lab work  Patient on Zocor 40 mg daily         Relevant Orders    Comprehensive Metabolic Panel    Lipid Panel    Follow Up In Primary Care - Established    Iron deficiency E61.1     Continue to monitor supplement as needed         Relevant Orders    CBC    Comprehensive Metabolic Panel    Follow Up In Primary Care - Established    Ferritin    Iron    Morbid obesity with BMI of 40.0-44.9, adult (Multi) E66.01, Z68.41     Reviewed diet and exercise with the patient spent extensive amount of time discussing diet strategies calorie counting we also discussed weight watchers Mediterranean diet intermittent fasting.  We touched on some of the newer medicines that are on the market but he really did not want to pursue those there are other limitations due to side effects and cost.         Thyroid disorder screen Z13.29     TSH with reflex         Relevant Orders    TSH with reflex to Free T4 if abnormal    Screening for prostate cancer Z12.5      Screening PSA         Relevant Orders    Prostate Specific Antigen, Screen    Type 2 diabetes mellitus without complication, without long-term current use of insulin (Multi) - Primary E11.9     Most recent A1c was 6.0% continue current diabetic medication and update blood work         Relevant Orders    CBC    Comprehensive Metabolic Panel    Hemoglobin A1C    Albumin-Creatinine Ratio, Urine Random    Follow Up In Primary Care - Established    Vitamin D deficiency E55.9     Continue to monitor supplement as needed         Relevant Orders    Vitamin D 25-Hydroxy,Total (for eval of Vitamin D levels)    Encounter for immunization Z23     High-dose flu shot x 1    We discussed RSV vaccine as well as COVID booster he will consider and get at pharmacy         Relevant Orders    Flu vaccine, trivalent, preservative free, HIGH-DOSE, age 65y+ (Fluzone) (Completed)

## 2024-10-03 NOTE — ASSESSMENT & PLAN NOTE
Reviewed diet and exercise with the patient spent extensive amount of time discussing diet strategies calorie counting we also discussed weight watchers Mediterranean diet intermittent fasting.  We touched on some of the newer medicines that are on the market but he really did not want to pursue those there are other limitations due to side effects and cost.   Complex Repair And Single Advancement Flap Text: The defect edges were debeveled with a #15 scalpel blade.  The primary defect was closed partially with a complex linear closure.  Given the location of the remaining defect, shape of the defect and the proximity to free margins a single advancement flap was deemed most appropriate for complete closure of the defect.  Using a sterile surgical marker, an appropriate advancement flap was drawn incorporating the defect and placing the expected incisions within the relaxed skin tension lines where possible.    The area thus outlined was incised deep to adipose tissue with a #15 scalpel blade.  The skin margins were undermined to an appropriate distance in all directions utilizing iris scissors.

## 2024-10-03 NOTE — ASSESSMENT & PLAN NOTE
High-dose flu shot x 1    We discussed RSV vaccine as well as COVID booster he will consider and get at pharmacy

## 2024-10-25 ENCOUNTER — LAB (OUTPATIENT)
Dept: LAB | Facility: LAB | Age: 74
End: 2024-10-25
Payer: MEDICARE

## 2024-10-25 DIAGNOSIS — E78.2 MIXED HYPERLIPIDEMIA: ICD-10-CM

## 2024-10-25 DIAGNOSIS — I10 BENIGN ESSENTIAL HYPERTENSION: ICD-10-CM

## 2024-10-25 DIAGNOSIS — E11.9 TYPE 2 DIABETES MELLITUS WITHOUT COMPLICATION, WITHOUT LONG-TERM CURRENT USE OF INSULIN (MULTI): ICD-10-CM

## 2024-10-25 DIAGNOSIS — E61.1 IRON DEFICIENCY: ICD-10-CM

## 2024-10-25 DIAGNOSIS — E55.9 VITAMIN D DEFICIENCY: ICD-10-CM

## 2024-10-25 DIAGNOSIS — M10.00 IDIOPATHIC GOUT, UNSPECIFIED CHRONICITY, UNSPECIFIED SITE: ICD-10-CM

## 2024-10-25 DIAGNOSIS — Z12.5 SCREENING FOR PROSTATE CANCER: ICD-10-CM

## 2024-10-25 DIAGNOSIS — Z13.29 THYROID DISORDER SCREEN: ICD-10-CM

## 2024-10-25 LAB
25(OH)D3 SERPL-MCNC: 65 NG/ML (ref 30–100)
ALBUMIN SERPL BCP-MCNC: 4.4 G/DL (ref 3.4–5)
ALP SERPL-CCNC: 54 U/L (ref 33–136)
ALT SERPL W P-5'-P-CCNC: 17 U/L (ref 10–52)
ANION GAP SERPL CALC-SCNC: 12 MMOL/L (ref 10–20)
AST SERPL W P-5'-P-CCNC: 20 U/L (ref 9–39)
BILIRUB SERPL-MCNC: 0.5 MG/DL (ref 0–1.2)
BUN SERPL-MCNC: 13 MG/DL (ref 6–23)
CALCIUM SERPL-MCNC: 9.4 MG/DL (ref 8.6–10.3)
CHLORIDE SERPL-SCNC: 101 MMOL/L (ref 98–107)
CHOLEST SERPL-MCNC: 174 MG/DL (ref 0–199)
CHOLESTEROL/HDL RATIO: 4
CO2 SERPL-SCNC: 30 MMOL/L (ref 21–32)
CREAT SERPL-MCNC: 0.98 MG/DL (ref 0.5–1.3)
CREAT UR-MCNC: 61.2 MG/DL (ref 20–370)
EGFRCR SERPLBLD CKD-EPI 2021: 80 ML/MIN/1.73M*2
ERYTHROCYTE [DISTWIDTH] IN BLOOD BY AUTOMATED COUNT: 14.6 % (ref 11.5–14.5)
EST. AVERAGE GLUCOSE BLD GHB EST-MCNC: 134 MG/DL
FERRITIN SERPL-MCNC: 521 NG/ML (ref 20–300)
GLUCOSE SERPL-MCNC: 94 MG/DL (ref 74–99)
HBA1C MFR BLD: 6.3 %
HCT VFR BLD AUTO: 42.6 % (ref 41–52)
HDLC SERPL-MCNC: 43.2 MG/DL
HGB BLD-MCNC: 13.9 G/DL (ref 13.5–17.5)
IRON SERPL-MCNC: 63 UG/DL (ref 35–150)
LDLC SERPL CALC-MCNC: 107 MG/DL
MCH RBC QN AUTO: 26.3 PG (ref 26–34)
MCHC RBC AUTO-ENTMCNC: 32.6 G/DL (ref 32–36)
MCV RBC AUTO: 81 FL (ref 80–100)
MICROALBUMIN UR-MCNC: 108.9 MG/L
MICROALBUMIN/CREAT UR: 177.9 UG/MG CREAT
NON HDL CHOLESTEROL: 131 MG/DL (ref 0–149)
NRBC BLD-RTO: 0 /100 WBCS (ref 0–0)
PLATELET # BLD AUTO: 213 X10*3/UL (ref 150–450)
POTASSIUM SERPL-SCNC: 4.3 MMOL/L (ref 3.5–5.3)
PROT SERPL-MCNC: 8.1 G/DL (ref 6.4–8.2)
PSA SERPL-MCNC: 0.56 NG/ML
RBC # BLD AUTO: 5.28 X10*6/UL (ref 4.5–5.9)
SODIUM SERPL-SCNC: 139 MMOL/L (ref 136–145)
TRIGL SERPL-MCNC: 121 MG/DL (ref 0–149)
TSH SERPL-ACNC: 1.84 MIU/L (ref 0.44–3.98)
URATE SERPL-MCNC: 7.1 MG/DL (ref 4–7.5)
VLDL: 24 MG/DL (ref 0–40)
WBC # BLD AUTO: 4.6 X10*3/UL (ref 4.4–11.3)

## 2024-10-25 PROCEDURE — 83036 HEMOGLOBIN GLYCOSYLATED A1C: CPT

## 2024-10-25 PROCEDURE — 36415 COLL VENOUS BLD VENIPUNCTURE: CPT

## 2024-10-25 PROCEDURE — 80061 LIPID PANEL: CPT

## 2024-10-25 PROCEDURE — 82043 UR ALBUMIN QUANTITATIVE: CPT

## 2024-10-25 PROCEDURE — 83540 ASSAY OF IRON: CPT

## 2024-10-25 PROCEDURE — 85027 COMPLETE CBC AUTOMATED: CPT

## 2024-10-25 PROCEDURE — 82728 ASSAY OF FERRITIN: CPT

## 2024-10-25 PROCEDURE — 82306 VITAMIN D 25 HYDROXY: CPT

## 2024-10-25 PROCEDURE — 80053 COMPREHEN METABOLIC PANEL: CPT

## 2024-10-25 PROCEDURE — G0103 PSA SCREENING: HCPCS

## 2024-10-25 PROCEDURE — 84443 ASSAY THYROID STIM HORMONE: CPT

## 2024-10-25 PROCEDURE — 84550 ASSAY OF BLOOD/URIC ACID: CPT

## 2024-10-25 PROCEDURE — 82570 ASSAY OF URINE CREATININE: CPT

## 2024-11-03 DIAGNOSIS — E53.8 B12 DEFICIENCY: ICD-10-CM

## 2024-11-03 DIAGNOSIS — E78.2 MIXED HYPERLIPIDEMIA: ICD-10-CM

## 2024-11-03 DIAGNOSIS — E11.9 TYPE 2 DIABETES MELLITUS WITHOUT COMPLICATION, WITHOUT LONG-TERM CURRENT USE OF INSULIN (MULTI): ICD-10-CM

## 2024-11-03 DIAGNOSIS — M10.00 IDIOPATHIC GOUT, UNSPECIFIED CHRONICITY, UNSPECIFIED SITE: ICD-10-CM

## 2024-11-03 DIAGNOSIS — I10 BENIGN ESSENTIAL HYPERTENSION: Primary | ICD-10-CM

## 2024-11-03 DIAGNOSIS — E61.1 IRON DEFICIENCY: ICD-10-CM

## 2025-02-10 ENCOUNTER — APPOINTMENT (OUTPATIENT)
Dept: PRIMARY CARE | Facility: CLINIC | Age: 75
End: 2025-02-10
Payer: MEDICARE

## 2025-02-11 ENCOUNTER — OFFICE VISIT (OUTPATIENT)
Dept: PRIMARY CARE | Facility: CLINIC | Age: 75
End: 2025-02-11
Payer: MEDICARE

## 2025-02-11 VITALS
SYSTOLIC BLOOD PRESSURE: 150 MMHG | HEIGHT: 68 IN | DIASTOLIC BLOOD PRESSURE: 77 MMHG | WEIGHT: 280 LBS | HEART RATE: 64 BPM | OXYGEN SATURATION: 92 % | TEMPERATURE: 98 F | BODY MASS INDEX: 42.44 KG/M2 | RESPIRATION RATE: 14 BRPM

## 2025-02-11 DIAGNOSIS — E78.2 MIXED HYPERLIPIDEMIA: ICD-10-CM

## 2025-02-11 DIAGNOSIS — Z00.00 MEDICARE ANNUAL WELLNESS VISIT, SUBSEQUENT: Primary | ICD-10-CM

## 2025-02-11 DIAGNOSIS — E11.9 TYPE 2 DIABETES MELLITUS WITHOUT COMPLICATION, WITHOUT LONG-TERM CURRENT USE OF INSULIN (MULTI): ICD-10-CM

## 2025-02-11 DIAGNOSIS — D57.3 SICKLE CELL TRAIT (CMS-HCC): ICD-10-CM

## 2025-02-11 DIAGNOSIS — E61.1 IRON DEFICIENCY: ICD-10-CM

## 2025-02-11 DIAGNOSIS — I10 BENIGN ESSENTIAL HYPERTENSION: ICD-10-CM

## 2025-02-11 DIAGNOSIS — Z00.00 ANNUAL PHYSICAL EXAM: ICD-10-CM

## 2025-02-11 DIAGNOSIS — E66.01 MORBID OBESITY WITH BMI OF 40.0-44.9, ADULT (MULTI): ICD-10-CM

## 2025-02-11 DIAGNOSIS — M10.00 IDIOPATHIC GOUT, UNSPECIFIED CHRONICITY, UNSPECIFIED SITE: ICD-10-CM

## 2025-02-11 PROCEDURE — 3074F SYST BP LT 130 MM HG: CPT | Performed by: FAMILY MEDICINE

## 2025-02-11 PROCEDURE — 1160F RVW MEDS BY RX/DR IN RCRD: CPT | Performed by: FAMILY MEDICINE

## 2025-02-11 PROCEDURE — 1170F FXNL STATUS ASSESSED: CPT | Performed by: FAMILY MEDICINE

## 2025-02-11 PROCEDURE — 1159F MED LIST DOCD IN RCRD: CPT | Performed by: FAMILY MEDICINE

## 2025-02-11 PROCEDURE — 3078F DIAST BP <80 MM HG: CPT | Performed by: FAMILY MEDICINE

## 2025-02-11 PROCEDURE — 1036F TOBACCO NON-USER: CPT | Performed by: FAMILY MEDICINE

## 2025-02-11 PROCEDURE — 1124F ACP DISCUSS-NO DSCNMKR DOCD: CPT | Performed by: FAMILY MEDICINE

## 2025-02-11 PROCEDURE — 99397 PER PM REEVAL EST PAT 65+ YR: CPT | Performed by: FAMILY MEDICINE

## 2025-02-11 PROCEDURE — 99213 OFFICE O/P EST LOW 20 MIN: CPT | Performed by: FAMILY MEDICINE

## 2025-02-11 PROCEDURE — G0439 PPPS, SUBSEQ VISIT: HCPCS | Performed by: FAMILY MEDICINE

## 2025-02-11 ASSESSMENT — ACTIVITIES OF DAILY LIVING (ADL)
MANAGING_FINANCES: INDEPENDENT
BATHING: INDEPENDENT
GROCERY_SHOPPING: INDEPENDENT
DRESSING: INDEPENDENT
DOING_HOUSEWORK: INDEPENDENT
TAKING_MEDICATION: INDEPENDENT

## 2025-02-11 ASSESSMENT — ENCOUNTER SYMPTOMS
ARTHRALGIAS: 0
SHORTNESS OF BREATH: 0
PALPITATIONS: 0
FEVER: 0
CONFUSION: 0
CHILLS: 0
CHEST TIGHTNESS: 0
ABDOMINAL PAIN: 0

## 2025-02-11 ASSESSMENT — PATIENT HEALTH QUESTIONNAIRE - PHQ9
1. LITTLE INTEREST OR PLEASURE IN DOING THINGS: NOT AT ALL
2. FEELING DOWN, DEPRESSED OR HOPELESS: NOT AT ALL
SUM OF ALL RESPONSES TO PHQ9 QUESTIONS 1 AND 2: 0

## 2025-02-11 NOTE — PROGRESS NOTES
"Subjective   Reason for Visit: Du Herrera is an 74 y.o. male here for a Medicare Wellness visit.     Past Medical, Surgical, and Family History reviewed and updated in chart.    Reviewed all medications by prescribing practitioner or clinical pharmacist (such as prescriptions, OTCs, herbal therapies and supplements) and documented in the medical record.    HPI patient today for follow-up of ongoing healthcare issues he did not get any of his lab work updated but will do so this week.  [facility] to have [facility]  Apparently recently they noticed a blood pressure discrepancy between his upper extremities with the left being about 20 points higher systolic than the right.  Subsequent underwent CT scan of the chest with no acute issues to explain this.  Per reports and chart it appears they consulted vascular surgery and are now scheduling patient for PVR testing.    Patient Care Team:  Ede Raygoza MD as PCP - General  Ede Raygoza MD as PCP - [employer] PCP     Review of Systems   Constitutional:  Negative for chills and fever.   HENT:  Negative for congestion and ear pain.    Eyes:  Negative for visual disturbance.   Respiratory:  Negative for chest tightness and shortness of breath.    Cardiovascular:  Negative for chest pain and palpitations.   Gastrointestinal:  Negative for abdominal pain.   Musculoskeletal:  Negative for arthralgias.   Skin:  Negative for pallor.   Psychiatric/Behavioral:  Negative for confusion.        Objective   Vitals:  /77 (BP Location: Left arm, Patient Position: Sitting, BP Cuff Size: Large adult)   Pulse 64   Temp 36.7 °C (98 °F)   Resp 14   Ht 1.715 m (5' 7.5\")   Wt 127 kg (280 lb)   SpO2 92%   BMI 43.21 kg/m²       Physical Exam  Vitals and nursing note reviewed.   Constitutional:       General: He is not in acute distress.     Appearance: Normal appearance. He is not ill-appearing.   HENT:      Head: Normocephalic " and atraumatic.      Right Ear: Tympanic membrane, ear canal and external ear normal.      Left Ear: Tympanic membrane, ear canal and external ear normal.      Mouth/Throat:      Pharynx: Oropharynx is clear.   Eyes:      Extraocular Movements: Extraocular movements intact.   Cardiovascular:      Rate and Rhythm: Normal rate and regular rhythm.      Pulses: Normal pulses.      Heart sounds: Normal heart sounds.   Pulmonary:      Effort: Pulmonary effort is normal.      Breath sounds: Normal breath sounds.   Abdominal:      General: Abdomen is flat. Bowel sounds are normal.      Palpations: Abdomen is soft.      Tenderness: There is no abdominal tenderness.   Musculoskeletal:         General: Normal range of motion.      Cervical back: Neck supple.   Skin:     General: Skin is warm.   Neurological:      Mental Status: He is alert and oriented to person, place, and time. Mental status is at baseline.   Psychiatric:         Mood and Affect: Mood normal.     Update fasting lab work    [facility] with regards to evaluation of blood pressure discrepancy    Return to our office in approximately 6 weeks to review labs as well as [facility] input and diagnostic test results.    Assessment & Plan  Medicare annual wellness visit, subsequent  Update fasting lab work this week  Follow [facility] with the [facility] for ongoing healthcare issues and further evaluation of blood pressure discrepancy  Immunization recommendations and updates reviewed    Colonoscopy up-to-date           Annual physical exam  Patient to get lab work updated    Immunization recommendations reviewed with patient         Type 2 diabetes mellitus without complication, without long-term current use of insulin (Multi)  update fasting lab work including A1c    Orders:    Follow Up In Primary Care - Established    Follow Up In Primary Care - Established; Future    Morbid obesity with BMI of 40.0-44.9, adult (Multi)  Dietary modification patient also states [facility]  has discussed the possibility of him starting Ozempic.           Iron deficiency  Continue to monitor supplement as needed    Orders:    Follow Up In Primary Care - Established    Mixed hyperlipidemia  Update fasting lipid  Continue simvastatin 40 mg nightly    Orders:    Follow Up In Primary Care - Established    Follow Up In Primary Care - Established; Future    Idiopathic gout, unspecified chronicity, unspecified site  Clinically stable no acute attacks reported patient on allopurinol    Orders:    Follow Up In Primary Care - Established    Benign essential hypertension  BP discrepancy between upper extremities left upper extremity appears to be about 20 points higher with systolic reading.  [facility] is also aware of this and have actually done a CT scan of his chest which was unremarkable per the reports they have consulted vascular surgery and had in the process of scheduling patient for PVR testing further plans pending those results.    Orders:    Follow Up In Primary Care - Established    Follow Up In Primary Care - Established; Future    Sickle cell trait (CMS-HCC)  Clinically stable

## 2025-02-11 NOTE — ASSESSMENT & PLAN NOTE
BP discrepancy between upper extremities left upper extremity appears to be about 20 points higher with systolic reading.  [facility] is also aware of this and have actually done a CT scan of his chest which was unremarkable per the reports they have consulted vascular surgery and had in the process of scheduling patient for PVR testing further plans pending those results.    Orders:    Follow Up In Primary Care - Established    Follow Up In Primary Care - Established; Future

## 2025-02-11 NOTE — ASSESSMENT & PLAN NOTE
Clinically stable no acute attacks reported patient on allopurinol    Orders:    Follow Up In Primary Care - Established

## 2025-02-11 NOTE — ASSESSMENT & PLAN NOTE
update fasting lab work including A1c    Orders:    Follow Up In Primary Care - Established    Follow Up In Primary Care - Established; Future

## 2025-02-11 NOTE — ASSESSMENT & PLAN NOTE
Dietary modification patient also states [facility] has discussed the possibility of him starting Ozempic.

## 2025-02-11 NOTE — ASSESSMENT & PLAN NOTE
Update fasting lab work this week  Follow [facility] with the [facility] for ongoing healthcare issues and further evaluation of blood pressure discrepancy  Immunization recommendations and updates reviewed    Colonoscopy up-to-date

## 2025-02-11 NOTE — ASSESSMENT & PLAN NOTE
Update fasting lipid  Continue simvastatin 40 mg nightly    Orders:    Follow Up In Primary Care - Established    Follow Up In Primary Care - Established; Future

## 2025-02-13 LAB
ALBUMIN SERPL-MCNC: 4.4 G/DL (ref 3.6–5.1)
ALP SERPL-CCNC: 57 U/L (ref 35–144)
ALT SERPL-CCNC: 15 U/L (ref 9–46)
ANION GAP SERPL CALCULATED.4IONS-SCNC: 8 MMOL/L (CALC) (ref 7–17)
AST SERPL-CCNC: 19 U/L (ref 10–35)
BILIRUB SERPL-MCNC: 0.5 MG/DL (ref 0.2–1.2)
BUN SERPL-MCNC: 14 MG/DL (ref 7–25)
CALCIUM SERPL-MCNC: 9.6 MG/DL (ref 8.6–10.3)
CHLORIDE SERPL-SCNC: 100 MMOL/L (ref 98–110)
CHOLEST SERPL-MCNC: 180 MG/DL
CHOLEST/HDLC SERPL: 3.9 (CALC)
CO2 SERPL-SCNC: 31 MMOL/L (ref 20–32)
CREAT SERPL-MCNC: 0.98 MG/DL (ref 0.7–1.28)
EGFRCR SERPLBLD CKD-EPI 2021: 80 ML/MIN/1.73M2
ERYTHROCYTE [DISTWIDTH] IN BLOOD BY AUTOMATED COUNT: 14.5 % (ref 11–15)
EST. AVERAGE GLUCOSE BLD GHB EST-MCNC: 140 MG/DL
EST. AVERAGE GLUCOSE BLD GHB EST-SCNC: 7.7 MMOL/L
FERRITIN SERPL-MCNC: 351 NG/ML (ref 24–380)
GLUCOSE SERPL-MCNC: 98 MG/DL (ref 65–99)
HBA1C MFR BLD: 6.5 % OF TOTAL HGB
HCT VFR BLD AUTO: 41.3 % (ref 38.5–50)
HDLC SERPL-MCNC: 46 MG/DL
HGB BLD-MCNC: 13.5 G/DL (ref 13.2–17.1)
IRON SERPL-MCNC: 76 MCG/DL (ref 50–180)
LDLC SERPL CALC-MCNC: 111 MG/DL (CALC)
MCH RBC QN AUTO: 26.8 PG (ref 27–33)
MCHC RBC AUTO-ENTMCNC: 32.7 G/DL (ref 32–36)
MCV RBC AUTO: 82.1 FL (ref 80–100)
NONHDLC SERPL-MCNC: 134 MG/DL (CALC)
PLATELET # BLD AUTO: 202 THOUSAND/UL (ref 140–400)
PMV BLD REES-ECKER: 10 FL (ref 7.5–12.5)
POTASSIUM SERPL-SCNC: 4 MMOL/L (ref 3.5–5.3)
PROT SERPL-MCNC: 8.2 G/DL (ref 6.1–8.1)
RBC # BLD AUTO: 5.03 MILLION/UL (ref 4.2–5.8)
SODIUM SERPL-SCNC: 139 MMOL/L (ref 135–146)
TRIGL SERPL-MCNC: 124 MG/DL
URATE SERPL-MCNC: 7.1 MG/DL (ref 4–8)
VIT B12 SERPL-MCNC: 1166 PG/ML (ref 200–1100)
WBC # BLD AUTO: 4.7 THOUSAND/UL (ref 3.8–10.8)

## 2025-03-26 ENCOUNTER — APPOINTMENT (OUTPATIENT)
Dept: PRIMARY CARE | Facility: CLINIC | Age: 75
End: 2025-03-26
Payer: MEDICARE

## 2025-04-07 ENCOUNTER — OFFICE VISIT (OUTPATIENT)
Dept: PRIMARY CARE | Facility: CLINIC | Age: 75
End: 2025-04-07
Payer: MEDICARE

## 2025-04-07 ENCOUNTER — HOSPITAL ENCOUNTER (OUTPATIENT)
Dept: RADIOLOGY | Facility: HOSPITAL | Age: 75
Discharge: HOME | End: 2025-04-07
Payer: MEDICARE

## 2025-04-07 VITALS
DIASTOLIC BLOOD PRESSURE: 82 MMHG | OXYGEN SATURATION: 95 % | HEART RATE: 71 BPM | WEIGHT: 279 LBS | RESPIRATION RATE: 14 BRPM | TEMPERATURE: 96.5 F | BODY MASS INDEX: 43.05 KG/M2 | SYSTOLIC BLOOD PRESSURE: 165 MMHG

## 2025-04-07 DIAGNOSIS — M10.00 IDIOPATHIC GOUT, UNSPECIFIED CHRONICITY, UNSPECIFIED SITE: ICD-10-CM

## 2025-04-07 DIAGNOSIS — R22.1 NECK MASS: ICD-10-CM

## 2025-04-07 DIAGNOSIS — E11.9 TYPE 2 DIABETES MELLITUS WITHOUT COMPLICATION, WITHOUT LONG-TERM CURRENT USE OF INSULIN: Primary | ICD-10-CM

## 2025-04-07 DIAGNOSIS — E53.8 B12 DEFICIENCY: ICD-10-CM

## 2025-04-07 DIAGNOSIS — E78.00 PURE HYPERCHOLESTEROLEMIA: ICD-10-CM

## 2025-04-07 DIAGNOSIS — E61.1 IRON DEFICIENCY: ICD-10-CM

## 2025-04-07 DIAGNOSIS — I10 BENIGN ESSENTIAL HYPERTENSION: ICD-10-CM

## 2025-04-07 PROCEDURE — 1160F RVW MEDS BY RX/DR IN RCRD: CPT | Performed by: FAMILY MEDICINE

## 2025-04-07 PROCEDURE — 3079F DIAST BP 80-89 MM HG: CPT | Performed by: FAMILY MEDICINE

## 2025-04-07 PROCEDURE — 70490 CT SOFT TISSUE NECK W/O DYE: CPT

## 2025-04-07 PROCEDURE — 1036F TOBACCO NON-USER: CPT | Performed by: FAMILY MEDICINE

## 2025-04-07 PROCEDURE — 1159F MED LIST DOCD IN RCRD: CPT | Performed by: FAMILY MEDICINE

## 2025-04-07 PROCEDURE — 99214 OFFICE O/P EST MOD 30 MIN: CPT | Performed by: FAMILY MEDICINE

## 2025-04-07 PROCEDURE — 4010F ACE/ARB THERAPY RXD/TAKEN: CPT | Performed by: FAMILY MEDICINE

## 2025-04-07 PROCEDURE — 3077F SYST BP >= 140 MM HG: CPT | Performed by: FAMILY MEDICINE

## 2025-04-07 PROCEDURE — 70490 CT SOFT TISSUE NECK W/O DYE: CPT | Performed by: RADIOLOGY

## 2025-04-07 PROCEDURE — G2211 COMPLEX E/M VISIT ADD ON: HCPCS | Performed by: FAMILY MEDICINE

## 2025-04-07 PROCEDURE — 1124F ACP DISCUSS-NO DSCNMKR DOCD: CPT | Performed by: FAMILY MEDICINE

## 2025-04-07 RX ORDER — ROSUVASTATIN CALCIUM 20 MG/1
20 TABLET, COATED ORAL DAILY
Qty: 30 TABLET | Refills: 5 | Status: SHIPPED | OUTPATIENT
Start: 2025-04-07 | End: 2025-10-04

## 2025-04-07 RX ORDER — LISINOPRIL 10 MG/1
10 TABLET ORAL DAILY
Qty: 30 TABLET | Refills: 5 | Status: SHIPPED | OUTPATIENT
Start: 2025-04-07 | End: 2025-10-04

## 2025-04-07 ASSESSMENT — ENCOUNTER SYMPTOMS
WHEEZING: 0
GASTROINTESTINAL NEGATIVE: 1
APNEA: 0
COUGH: 1
CHOKING: 0
STRIDOR: 0
CONSTITUTIONAL NEGATIVE: 1
CHEST TIGHTNESS: 0
CARDIOVASCULAR NEGATIVE: 1
SORE THROAT: 1
SHORTNESS OF BREATH: 0
NEUROLOGICAL NEGATIVE: 1

## 2025-04-07 ASSESSMENT — PATIENT HEALTH QUESTIONNAIRE - PHQ9
SUM OF ALL RESPONSES TO PHQ9 QUESTIONS 1 AND 2: 0
2. FEELING DOWN, DEPRESSED OR HOPELESS: NOT AT ALL
1. LITTLE INTEREST OR PLEASURE IN DOING THINGS: NOT AT ALL

## 2025-04-07 NOTE — ASSESSMENT & PLAN NOTE
Right sided painful neck mass  Stat CT of the neck  ENT referral to head and neck specialist  Continue Augmentin 875 mg twice daily    Call or go to ER if anything worsens or if he has any further difficulty swallowing otherwise return to office in 2 weeks to reassess his case

## 2025-04-07 NOTE — ASSESSMENT & PLAN NOTE
Patient still working with the [facility] with regards to BP discrepancy between upper extremities but even so blood pressure remains elevated will add lisinopril 10 mg daily

## 2025-04-07 NOTE — PROGRESS NOTES
Subjective   Patient ID: Du Herrera is a 74 y.o. male who presents for Sore Throat and Cough (Toothache, swelling jaw, seen yesterday at urgent care on antibiotic. ).    Sore Throat   Associated symptoms include coughing. Pertinent negatives include no shortness of breath or stridor.   Cough  Associated symptoms include a sore throat. Pertinent negatives include no shortness of breath or wheezing.      Patient today complaining of painful right sided neck mass for the past 3 to 4 weeks he thought it would just go away ended up going to urgent care over the weekend they gave him a prescription for Augmentin and sent him home.  He says he has noticed some difficulty swallowing stating yesterday his metformin got stuck in his throat.  Denies any pain in his teeth denies any fever or chills.  Says is not really a sore throat more just pain in the right side of the neck where he notices hard mass.  Regarding [facility] he says he still following with them he did complete testing with regards to upper extremity BP discrepancy but he has not been  back yet to go over results.  Review of Systems   Constitutional: Negative.    HENT:  Positive for sore throat.         Right sided neck mass   Respiratory:  Positive for cough. Negative for apnea, choking, chest tightness, shortness of breath, wheezing and stridor.    Cardiovascular: Negative.    Gastrointestinal: Negative.    Neurological: Negative.        Objective   /82   Pulse 71   Temp 35.8 °C (96.5 °F)   Resp 14   Wt 127 kg (279 lb)   SpO2 95%   BMI 43.05 kg/m²     Physical Exam  Vitals and nursing note reviewed.   Constitutional:       General: He is not in acute distress.     Appearance: Normal appearance. He is not ill-appearing.   HENT:      Head: Normocephalic and atraumatic.      Right Ear: Tympanic membrane, ear canal and external ear normal.      Left Ear: Tympanic membrane, ear canal and external ear normal.      Nose: Nose normal.      Mouth/Throat:       Pharynx: Oropharynx is clear. No oropharyngeal exudate or posterior oropharyngeal erythema.   Eyes:      Extraocular Movements: Extraocular movements intact.   Neck:      Comments: Large tender firm right lateral neck mass  Cardiovascular:      Rate and Rhythm: Normal rate and regular rhythm.      Pulses: Normal pulses.      Heart sounds: Normal heart sounds.   Pulmonary:      Effort: Pulmonary effort is normal.      Breath sounds: Normal breath sounds.   Abdominal:      General: Abdomen is flat. Bowel sounds are normal.      Palpations: Abdomen is soft.      Tenderness: There is no abdominal tenderness.   Musculoskeletal:         General: Normal range of motion.      Cervical back: Tenderness present.   Skin:     General: Skin is warm.   Neurological:      Mental Status: He is alert and oriented to person, place, and time. Mental status is at baseline.   Psychiatric:         Mood and Affect: Mood normal.       Recent Results (from the past 12 weeks)   Lipid Panel    Collection Time: 02/12/25  9:48 AM   Result Value Ref Range    CHOLESTEROL, TOTAL 180 <200 mg/dL    HDL CHOLESTEROL 46 > OR = 40 mg/dL    TRIGLYCERIDES 124 <150 mg/dL    LDL-CHOLESTEROL 111 (H) mg/dL (calc)    CHOL/HDLC RATIO 3.9 <5.0 (calc)    NON HDL CHOLESTEROL 134 (H) <130 mg/dL (calc)   Iron    Collection Time: 02/12/25  9:48 AM   Result Value Ref Range    IRON, TOTAL 76 50 - 180 mcg/dL   Uric Acid    Collection Time: 02/12/25  9:48 AM   Result Value Ref Range    URIC ACID 7.1 4.0 - 8.0 mg/dL   Comprehensive Metabolic Panel    Collection Time: 02/12/25  9:48 AM   Result Value Ref Range    GLUCOSE 98 65 - 99 mg/dL    UREA NITROGEN (BUN) 14 7 - 25 mg/dL    CREATININE 0.98 0.70 - 1.28 mg/dL    EGFR 80 > OR = 60 mL/min/1.73m2    SODIUM 139 135 - 146 mmol/L    POTASSIUM 4.0 3.5 - 5.3 mmol/L    CHLORIDE 100 98 - 110 mmol/L    CARBON DIOXIDE 31 20 - 32 mmol/L    ELECTROLYTE BALANCE 8 7 - 17 mmol/L (calc)    CALCIUM 9.6 8.6 - 10.3 mg/dL    PROTEIN, TOTAL  8.2 (H) 6.1 - 8.1 g/dL    ALBUMIN 4.4 3.6 - 5.1 g/dL    BILIRUBIN, TOTAL 0.5 0.2 - 1.2 mg/dL    ALKALINE PHOSPHATASE 57 35 - 144 U/L    AST 19 10 - 35 U/L    ALT 15 9 - 46 U/L   CBC    Collection Time: 02/12/25  9:48 AM   Result Value Ref Range    WHITE BLOOD CELL COUNT 4.7 3.8 - 10.8 Thousand/uL    RED BLOOD CELL COUNT 5.03 4.20 - 5.80 Million/uL    HEMOGLOBIN 13.5 13.2 - 17.1 g/dL    HEMATOCRIT 41.3 38.5 - 50.0 %    MCV 82.1 80.0 - 100.0 fL    MCH 26.8 (L) 27.0 - 33.0 pg    MCHC 32.7 32.0 - 36.0 g/dL    RDW 14.5 11.0 - 15.0 %    PLATELET COUNT 202 140 - 400 Thousand/uL    MPV 10.0 7.5 - 12.5 fL   Ferritin    Collection Time: 02/12/25  9:48 AM   Result Value Ref Range    FERRITIN 351 24 - 380 ng/mL   Vitamin B12    Collection Time: 02/12/25  9:48 AM   Result Value Ref Range    VITAMIN B12 1,166 (H) 200 - 1,100 pg/mL   Hemoglobin A1C    Collection Time: 02/12/25  9:48 AM   Result Value Ref Range    HEMOGLOBIN A1c 6.5 (H) <5.7 % of total Hgb    eAG (mg/dL) 140 mg/dL    eAG (mmol/L) 7.7 mmol/L     Labs from February reviewed with patient  B12 elevated hold B12 supplement    A1c stable continue current diabetic regimen    LDL not to goal discontinue Zocor and start Crestor 20 mg daily    BP discrepancy noted however neither right or left upper extremity blood pressures to goal add lisinopril 10 mg daily continue his other antihypertensive medicines follow-[facility] with regards to workup of BP discrepancy    Painful right sided neck mass.  In conjunction with patient having some difficulty swallowing.  Had CT of the neck/soft tissue  ENT referral  Continue Augmentin 875 mg twice daily    Call or go to ER if any of his symptoms worsen    Return to office in 2 weeks to reassess his case      Assessment/Plan   Problem List Items Addressed This Visit             ICD-10-CM    Benign essential hypertension I10     Patient still [facility] with the [facility] with regards to BP discrepancy between upper extremities but even  so blood pressure remains elevated will add lisinopril 10 mg daily         Relevant Medications    lisinopril 10 mg tablet    Gout M10.9     Clinically stable uric acid in normal range continue allopurinol         Hyperlipidemia E78.5     DL is not at goal especially in view of his diabetes discontinue Zocor start Crestor 20 mg daily         Relevant Medications    rosuvastatin (Crestor) 20 mg tablet    Iron deficiency E61.1     Stable continue to monitor and supplement         Type 2 diabetes mellitus without complication, without long-term current use of insulin - Primary E11.9     A1c 6.5% continue current treatment and dietary modifications         B12 deficiency E53.8     B12 level elevated hold B12 for now we will monitor in the future.         Neck mass R22.1     Right sided painful neck mass  Stat CT of the neck  ENT referral to head and neck specialist  Continue Augmentin 875 mg twice daily    Call or go to ER if anything worsens or if he has any further difficulty swallowing otherwise return to office in 2 weeks to reassess his case         Relevant Orders    Referral to ENT    CT soft tissue neck wo IV contrast    Follow Up In Primary Care - Established

## 2025-04-08 ENCOUNTER — TELEPHONE (OUTPATIENT)
Dept: PRIMARY CARE | Facility: CLINIC | Age: 75
End: 2025-04-08
Payer: MEDICARE

## 2025-04-08 NOTE — PROGRESS NOTES
No chief complaint on file.    JUANITA Herrera is a 74 y.o. male referred to me today by Ede Raygoza MD for further evaluation and treatment of a neck mass. He notes a painful right sided neck mass, present for about a month.  He has noticed some difficulty swallowing. CT showed -right submandibular gland is diffusely enlarged and has heterogeneous density with no localized abscess or mass. There are several punctate calcifications from the hilum of the gland and medially in the area of edematous tissue    Social History  He reports that he has never smoked. He has been exposed to tobacco smoke. He has never used smokeless tobacco. He reports that he does not currently use alcohol. He reports that he does not use drugs.    PMH  He has a past medical history of Diabetes mellitus (Multi) and Hypertension.     PSH  He has a past surgical history that includes Colonoscopy (08/12/2015) and Other surgical history (08/22/2022).    ROS  Review of Systems     PE  ENT Physical Exam     Procedures     ASSESSMENT AND PLAN  Problem List Items Addressed This Visit       Neck mass    Salivary gland stone            By signing my name below, I, Crystal Crane, attest that this documentation has been prepared under the direction and in the presence of Dr. Yumi Dyer MD.     All medical record entries made by the Scribe were at my direction and personally dictated by me, Dr. Yumi Dyer. I have reviewed the chart and agree that the record accurately reflects my personal performance of the history, physical exam, discussion and plan.

## 2025-04-08 NOTE — TELEPHONE ENCOUNTER
I was able to schedule an ENT visit for the patient for tomorrow, 4/9/2025.     However, it is at the  in [facility].     The patient stated he cannot make it [facility] apt [facility]     Du did want to know if the ENT appointment is still necessary and if it is can he schedule out down the road?

## 2025-04-09 ENCOUNTER — OFFICE VISIT (OUTPATIENT)
Dept: OTOLARYNGOLOGY | Facility: HOSPITAL | Age: 75
End: 2025-04-09
Payer: MEDICARE

## 2025-04-09 VITALS — WEIGHT: 272.8 LBS | HEIGHT: 68 IN | TEMPERATURE: 96.4 F | BODY MASS INDEX: 41.34 KG/M2

## 2025-04-09 DIAGNOSIS — K11.5 SALIVARY GLAND STONE: ICD-10-CM

## 2025-04-09 DIAGNOSIS — R22.1 NECK MASS: ICD-10-CM

## 2025-04-09 PROCEDURE — 4010F ACE/ARB THERAPY RXD/TAKEN: CPT | Performed by: OTOLARYNGOLOGY

## 2025-04-09 PROCEDURE — 1160F RVW MEDS BY RX/DR IN RCRD: CPT | Performed by: OTOLARYNGOLOGY

## 2025-04-09 PROCEDURE — 99214 OFFICE O/P EST MOD 30 MIN: CPT | Mod: 25 | Performed by: OTOLARYNGOLOGY

## 2025-04-09 PROCEDURE — 1159F MED LIST DOCD IN RCRD: CPT | Performed by: OTOLARYNGOLOGY

## 2025-04-09 PROCEDURE — 42330 REMOVAL OF SALIVARY STONE: CPT | Performed by: OTOLARYNGOLOGY

## 2025-04-09 PROCEDURE — 1126F AMNT PAIN NOTED NONE PRSNT: CPT | Performed by: OTOLARYNGOLOGY

## 2025-04-09 PROCEDURE — 1123F ACP DISCUSS/DSCN MKR DOCD: CPT | Performed by: OTOLARYNGOLOGY

## 2025-04-09 PROCEDURE — 99204 OFFICE O/P NEW MOD 45 MIN: CPT | Performed by: OTOLARYNGOLOGY

## 2025-04-09 ASSESSMENT — PAIN SCALES - GENERAL: PAINLEVEL_OUTOF10: 0-NO PAIN

## 2025-04-09 NOTE — TELEPHONE ENCOUNTER
Left detailed message from Dr Raygoza regarding referral to ENT. Patient should contact our office if he is unable to reschedule the appointment in a timely manner.

## 2025-04-09 NOTE — PROGRESS NOTES
Chief Complaint   Patient presents with    Neck Mass     HPI  Du Herrera is a 74 y.o. male referred to me today by Ede Raygoza MD for further evaluation and treatment of a neck mass. He notes a painful right sided neck mass, present for about a month.  He has noticed some difficulty swallowing. CT showed -right submandibular gland is diffusely enlarged and has heterogeneous density with no localized abscess or mass. There are several punctate calcifications from the hilum of the gland and medially in the area of edematous tissue    Today patient endorses significant improvement in symptoms after being recently started on a 2 week course of Augmentin. He states that he likely felt the stone in his floor of mouth roughly 1 month ago and that this has since persisted. Denies any smoking history.     Social History  He reports that he has never smoked. He has been exposed to tobacco smoke. He has never used smokeless tobacco. He reports that he does not currently use alcohol. He reports that he does not use drugs.    PMH  He has a past medical history of Diabetes mellitus (Multi) and Hypertension.     PSH  He has a past surgical history that includes Colonoscopy (08/12/2015) and Other surgical history (08/22/2022).    ROS  Full 10 pt ROS performed, pertinent positives and negatives per HPI      PE  Physical Exam:  Constitutional:  No acute distress  Voice:  No hoarseness or other abnormality  Respiration:  Breathing comfortably, no stridor  Cardiovascular:  No clubbing/cyanosis/edema in hands  Eyes:  EOM intact, sclera normal  Neuro:  Alert and oriented times 3, Cranial nerves grossly intact and symmetric bilaterally  Head and Face:  Symmetric facial features, no masses or lesions, sinuses non-tender to palpation  Right Ear:  Normal external ear, external auditory canal, and TM to otoscopy, normal hearing to whispered voice.  Left Ear: Normal external ear, external auditory canal, and TM to otoscopy, normal  hearing to whispered voice.  Nose:  External nose midline, no bleeding or drainage, no lesions  Oral Cavity/Oropharynx/Lips:  Normal mucous membranes, floor of mouth moderately elevated with hard palpable stone near the submandibular caruncle, slight drainage appreciated from caruncle which appears slightly eroded through by underlying stone  Pharynx: no masses or lesions  Neck/Lymph:  No LAD, no thyroid masses, trachea midline, significantly enlarged R submandibular gland  Skin:  Neck skin is without scar or injury  Psych:  Alert and oriented with appropriate mood and affect      Procedure Note: Sialolithotomy   Verbal informed consent was obtained from the patient. 4% lidocaine was prepared and placed onto a gauze fluff which was placed over the site of proecdure. Afterwards 1% lidocaine was injected locally in the floor of mouth. A 15 blade and scissors were used to first incise the floor of mouth at the site of the submandibular duct and then to subsequently spread it open. A stone was partially visualized; however, unable to be removed as it was incredibly adherent to the duct. Thus procedure was concluded with stone still in place but duct widely dilated for egress of sludge.      ASSESSMENT AND PLAN  Problem List Items Addressed This Visit       Neck mass    Salivary gland stone      - Patient is s/p in-office sialolithotomy  - Patient has been educated on appropriate hydration, using lemon extract as a sialogouge, warm compress and routine massage   - Patient can continue with abx until a 7 day course is completed  - Patient can follow-up FAYE Snowden MD - PGY1  Otolaryngology - Head and Neck Surgery  Head & Neck team phone: [phone]  ENT consult pager: [phone]   Pediatric ENT pager: [phone]  ENT Outpatient scheduling number: [phone]        personally obtained the key and critical portions of the history and physical exam or was physically present for key and critical portions performed by the resident/fellow. I reviewed the resident/fellow's documentation and discussed the patient with the resident/fellow. I agree with the resident/fellow's medical decision making as documented in the note.    Yumi Dyer MD

## 2025-04-22 ENCOUNTER — APPOINTMENT (OUTPATIENT)
Dept: PRIMARY CARE | Facility: CLINIC | Age: 75
End: 2025-04-22
Payer: MEDICARE

## 2025-04-22 VITALS
WEIGHT: 273 LBS | SYSTOLIC BLOOD PRESSURE: 106 MMHG | OXYGEN SATURATION: 93 % | BODY MASS INDEX: 42.13 KG/M2 | TEMPERATURE: 97.3 F | RESPIRATION RATE: 14 BRPM | HEART RATE: 61 BPM | DIASTOLIC BLOOD PRESSURE: 63 MMHG

## 2025-04-22 DIAGNOSIS — E78.2 MIXED HYPERLIPIDEMIA: ICD-10-CM

## 2025-04-22 DIAGNOSIS — R22.1 NECK MASS: Primary | ICD-10-CM

## 2025-04-22 DIAGNOSIS — E55.9 VITAMIN D DEFICIENCY: ICD-10-CM

## 2025-04-22 DIAGNOSIS — E53.8 B12 DEFICIENCY: ICD-10-CM

## 2025-04-22 DIAGNOSIS — I10 BENIGN ESSENTIAL HYPERTENSION: ICD-10-CM

## 2025-04-22 DIAGNOSIS — M10.00 IDIOPATHIC GOUT, UNSPECIFIED CHRONICITY, UNSPECIFIED SITE: ICD-10-CM

## 2025-04-22 DIAGNOSIS — E11.9 TYPE 2 DIABETES MELLITUS WITHOUT COMPLICATION, WITHOUT LONG-TERM CURRENT USE OF INSULIN: ICD-10-CM

## 2025-04-22 DIAGNOSIS — K11.5 SALIVARY GLAND STONE: ICD-10-CM

## 2025-04-22 DIAGNOSIS — E61.1 IRON DEFICIENCY: ICD-10-CM

## 2025-04-22 PROCEDURE — 1159F MED LIST DOCD IN RCRD: CPT | Performed by: FAMILY MEDICINE

## 2025-04-22 PROCEDURE — 4010F ACE/ARB THERAPY RXD/TAKEN: CPT | Performed by: FAMILY MEDICINE

## 2025-04-22 PROCEDURE — 1160F RVW MEDS BY RX/DR IN RCRD: CPT | Performed by: FAMILY MEDICINE

## 2025-04-22 PROCEDURE — 99213 OFFICE O/P EST LOW 20 MIN: CPT | Performed by: FAMILY MEDICINE

## 2025-04-22 PROCEDURE — 3074F SYST BP LT 130 MM HG: CPT | Performed by: FAMILY MEDICINE

## 2025-04-22 PROCEDURE — 1036F TOBACCO NON-USER: CPT | Performed by: FAMILY MEDICINE

## 2025-04-22 PROCEDURE — G2211 COMPLEX E/M VISIT ADD ON: HCPCS | Performed by: FAMILY MEDICINE

## 2025-04-22 PROCEDURE — 1123F ACP DISCUSS/DSCN MKR DOCD: CPT | Performed by: FAMILY MEDICINE

## 2025-04-22 PROCEDURE — 3078F DIAST BP <80 MM HG: CPT | Performed by: FAMILY MEDICINE

## 2025-04-22 ASSESSMENT — ENCOUNTER SYMPTOMS
ARTHRALGIAS: 0
CHEST TIGHTNESS: 0
SHORTNESS OF BREATH: 0
FEVER: 0
CONFUSION: 0
CHILLS: 0
PALPITATIONS: 0
ABDOMINAL PAIN: 0

## 2025-04-22 NOTE — PROGRESS NOTES
Subjective   Patient ID: Du Herrera is a 74 y.o. male who presents for Follow-up (2 week).    HPI patient is today for follow-up of ongoing health care issues and review recent lab work overalls been doing okay since he has been in the last he saw ENT they confirmed blocked duct of his right submandibular gland he underwent a procedure to open up the duct and allowed the gland to drain.  He has had marked improvement gland has decreased in size considerably still feels a small nontender bump in the area but overall doing much better.  Also states he is tolerating the lisinopril Crestor.  And here today for BP recheck    Review of Systems   Constitutional:  Negative for chills and fever.   HENT:  Negative for congestion and ear pain.    Eyes:  Negative for visual disturbance.   Respiratory:  Negative for chest tightness and shortness of breath.    Cardiovascular:  Negative for chest pain and palpitations.   Gastrointestinal:  Negative for abdominal pain.   Musculoskeletal:  Negative for arthralgias.   Skin:  Negative for pallor.   Psychiatric/Behavioral:  Negative for confusion.        Objective   /63   Pulse 61   Temp 36.3 °C (97.3 °F)   Resp 14   Wt 124 kg (273 lb)   SpO2 93%   BMI 42.13 kg/m²     Physical Exam  Vitals and nursing note reviewed.   Constitutional:       General: He is not in acute distress.     Appearance: Normal appearance. He is not ill-appearing.   HENT:      Head: Normocephalic and atraumatic.      Right Ear: Tympanic membrane, ear canal and external ear normal.      Left Ear: Tympanic membrane, ear canal and external ear normal.      Mouth/Throat:      Pharynx: Oropharynx is clear.   Eyes:      Extraocular Movements: Extraocular movements intact.   Neck:      Comments: Right anterior lateral neck reveals a small submandibular gland which is nontender overall markedly improved character to previous exam a few weeks ago  Cardiovascular:      Rate and Rhythm: Normal rate and regular  rhythm.      Pulses: Normal pulses.      Heart sounds: Normal heart sounds.   Pulmonary:      Effort: Pulmonary effort is normal.      Breath sounds: Normal breath sounds.   Abdominal:      General: Abdomen is flat. Bowel sounds are normal.      Palpations: Abdomen is soft.      Tenderness: There is no abdominal tenderness.   Musculoskeletal:         General: Normal range of motion.      Cervical back: Neck supple. No rigidity or tenderness.   Lymphadenopathy:      Cervical: No cervical adenopathy.   Skin:     General: Skin is warm.   Neurological:      Mental Status: He is alert and oriented to person, place, and time. Mental status is at baseline.   Psychiatric:         Mood and Affect: Mood normal.     ENT consult noted clinically patient doing much better continue to monitor    Blood pressure improved continue current medication    Return to office 4 months with repeat fasting labs and reassessment of his case    Assessment/Plan   Problem List Items Addressed This Visit           ICD-10-CM    Benign essential hypertension I10    Improvement in blood pressure continue current medications         Relevant Orders    Comprehensive Metabolic Panel    Follow Up In Primary Care - Established    Gout M10.9    Continue to monitor and continue allopurinol         Relevant Orders    Uric Acid    Follow Up In Primary Care - Established    Hyperlipidemia E78.5    Check fasting lipid continue Crestor 20 mg daily remain off of Zocor         Relevant Orders    Comprehensive Metabolic Panel    Lipid Panel    Follow Up In Primary Care - Established    Iron deficiency E61.1    Continue to monitor supplement as needed         Relevant Orders    CBC    Comprehensive Metabolic Panel    Ferritin    Iron    Follow Up In Primary Care - Established    Type 2 diabetes mellitus without complication, without long-term current use of insulin E11.9    Check fasting blood sugar and A1c continue current treatment         Relevant Orders    CBC     Comprehensive Metabolic Panel    Hemoglobin A1C    Follow Up In Primary Care - Established    Vitamin D deficiency E55.9    Monitor supplement as needed         Relevant Orders    Vitamin D 25-Hydroxy,Total (for eval of Vitamin D levels)    B12 deficiency E53.8     monitor and supplement as needed         Relevant Orders    Vitamin B12    Follow Up In Primary Care - Established    Neck mass - Primary R22.1    Marked improvement in swollen submandibular gland on the right side secondary to obstruction.    Still with slight fullness noted but no tenderness and overall much improved    consult noted; procedure note noted in which they opened the duct and allow the gland to drain         Salivary gland stone K11.5    ENT consult noted clinically patient doing much better

## 2025-04-22 NOTE — ASSESSMENT & PLAN NOTE
Marked improvement in swollen submandibular gland on the right side secondary to obstruction.    Still with slight fullness noted but no tenderness and overall much improved    consult noted; procedure note noted in which they opened the duct and allow the gland to drain

## 2025-04-29 ENCOUNTER — HOSPITAL ENCOUNTER (OUTPATIENT)
Dept: RADIOLOGY | Facility: CLINIC | Age: 75
Discharge: HOME | End: 2025-04-29
Payer: MEDICARE

## 2025-04-29 ENCOUNTER — OFFICE VISIT (OUTPATIENT)
Dept: PRIMARY CARE | Facility: CLINIC | Age: 75
End: 2025-04-29
Payer: MEDICARE

## 2025-04-29 ENCOUNTER — TELEPHONE (OUTPATIENT)
Dept: PRIMARY CARE | Facility: CLINIC | Age: 75
End: 2025-04-29

## 2025-04-29 VITALS
DIASTOLIC BLOOD PRESSURE: 78 MMHG | BODY MASS INDEX: 42.44 KG/M2 | HEART RATE: 70 BPM | WEIGHT: 275 LBS | TEMPERATURE: 97.8 F | SYSTOLIC BLOOD PRESSURE: 134 MMHG | RESPIRATION RATE: 16 BRPM | OXYGEN SATURATION: 96 %

## 2025-04-29 DIAGNOSIS — J20.9 ACUTE BRONCHITIS WITH BRONCHOSPASM: ICD-10-CM

## 2025-04-29 DIAGNOSIS — J20.9 ACUTE BRONCHITIS WITH BRONCHOSPASM: Primary | ICD-10-CM

## 2025-04-29 DIAGNOSIS — J01.80 OTHER ACUTE SINUSITIS, RECURRENCE NOT SPECIFIED: ICD-10-CM

## 2025-04-29 PROBLEM — J01.90 ACUTE SINUSITIS: Status: ACTIVE | Noted: 2025-04-29

## 2025-04-29 LAB
POC BINAX EXPIRATION: NORMAL
POC BINAX NOW COVID SERIAL NUMBER: NORMAL
POC SARS-COV-2 AG BINAX: NORMAL

## 2025-04-29 PROCEDURE — 3078F DIAST BP <80 MM HG: CPT | Performed by: FAMILY MEDICINE

## 2025-04-29 PROCEDURE — 1159F MED LIST DOCD IN RCRD: CPT | Performed by: FAMILY MEDICINE

## 2025-04-29 PROCEDURE — 99213 OFFICE O/P EST LOW 20 MIN: CPT | Performed by: FAMILY MEDICINE

## 2025-04-29 PROCEDURE — 87811 SARS-COV-2 COVID19 W/OPTIC: CPT | Performed by: FAMILY MEDICINE

## 2025-04-29 PROCEDURE — 4010F ACE/ARB THERAPY RXD/TAKEN: CPT | Performed by: FAMILY MEDICINE

## 2025-04-29 PROCEDURE — 1160F RVW MEDS BY RX/DR IN RCRD: CPT | Performed by: FAMILY MEDICINE

## 2025-04-29 PROCEDURE — 3075F SYST BP GE 130 - 139MM HG: CPT | Performed by: FAMILY MEDICINE

## 2025-04-29 PROCEDURE — 71046 X-RAY EXAM CHEST 2 VIEWS: CPT | Performed by: RADIOLOGY

## 2025-04-29 PROCEDURE — 1123F ACP DISCUSS/DSCN MKR DOCD: CPT | Performed by: FAMILY MEDICINE

## 2025-04-29 PROCEDURE — 1036F TOBACCO NON-USER: CPT | Performed by: FAMILY MEDICINE

## 2025-04-29 PROCEDURE — 71046 X-RAY EXAM CHEST 2 VIEWS: CPT

## 2025-04-29 RX ORDER — CEFDINIR 300 MG/1
300 CAPSULE ORAL 2 TIMES DAILY
Qty: 20 CAPSULE | Refills: 0 | Status: SHIPPED | OUTPATIENT
Start: 2025-04-29 | End: 2025-05-09

## 2025-04-29 RX ORDER — ALBUTEROL SULFATE 90 UG/1
2 INHALANT RESPIRATORY (INHALATION) EVERY 6 HOURS PRN
Qty: 18 G | Refills: 0 | Status: SHIPPED | OUTPATIENT
Start: 2025-04-29 | End: 2025-05-29

## 2025-04-29 RX ORDER — PREDNISONE 10 MG/1
TABLET ORAL
Qty: 30 TABLET | Refills: 0 | Status: SHIPPED | OUTPATIENT
Start: 2025-04-29 | End: 2025-05-09

## 2025-04-29 ASSESSMENT — ENCOUNTER SYMPTOMS
CHILLS: 0
SORE THROAT: 1
CONFUSION: 0
CHEST TIGHTNESS: 0
PALPITATIONS: 0
ARTHRALGIAS: 0
FEVER: 0
COUGH: 1
ABDOMINAL PAIN: 0
SHORTNESS OF BREATH: 1

## 2025-04-29 NOTE — ASSESSMENT & PLAN NOTE
Rapid COVID-negative    Chest x-ray  Omnicef 300 mg twice daily x 10 days  Over-the-counter Coricidin cough medication  Prednisone 10 mg taper  Albuterol inhaler as directed    Call or go to ER if anything worsens return in 10 to 14 days if no better

## 2025-04-29 NOTE — PROGRESS NOTES
Subjective   Patient ID: Du Herrera is a 74 y.o. male who presents for Cough, Sore Throat, and Shortness of Breath (Symptoms for 7 days).    Cough  Associated symptoms include postnasal drip, a sore throat and shortness of breath. Pertinent negatives include no chest pain, chills, ear pain or fever.   Sore Throat   Associated symptoms include congestion, coughing and shortness of breath. Pertinent negatives include no abdominal pain or ear pain.   Shortness of Breath  Associated symptoms include a sore throat. Pertinent negatives include no abdominal pain, chest pain, ear pain or fever.      Patient in today complaining over the past 5 days of upper respiratory symptoms.  Complains of chest congestion coughing to the point that it is keeping him up at night and other times it causes him to gag because he is coughing so intensely.  No chest pain.  Also has sore throat nasal congestion some drainage.  No fever or chills.  Review of Systems   Constitutional:  Negative for chills and fever.   HENT:  Positive for congestion, postnasal drip and sore throat. Negative for ear pain.    Eyes:  Negative for visual disturbance.   Respiratory:  Positive for cough and shortness of breath. Negative for chest tightness.    Cardiovascular:  Negative for chest pain and palpitations.   Gastrointestinal:  Negative for abdominal pain.   Musculoskeletal:  Negative for arthralgias.   Skin:  Negative for pallor.   Psychiatric/Behavioral:  Negative for confusion.        Objective   /77   Pulse 70   Temp 36.6 °C (97.8 °F)   Resp 16   Wt 125 kg (275 lb)   SpO2 96%   BMI 42.44 kg/m²     Physical Exam  Vitals and nursing note reviewed.   Constitutional:       General: He is not in acute distress.     Appearance: Normal appearance. He is not ill-appearing.   HENT:      Head: Normocephalic and atraumatic.      Right Ear: Tympanic membrane, ear canal and external ear normal.      Left Ear: Tympanic membrane, ear canal and external ear  normal.      Mouth/Throat:      Pharynx: Oropharynx is clear.   Eyes:      Extraocular Movements: Extraocular movements intact.   Cardiovascular:      Rate and Rhythm: Normal rate and regular rhythm.      Pulses: Normal pulses.      Heart sounds: Normal heart sounds.   Pulmonary:      Effort: Pulmonary effort is normal.      Breath sounds: Normal breath sounds.      Comments: Positive cough with sounds like almost of bronchospasm at times  Abdominal:      General: Abdomen is flat. Bowel sounds are normal.      Palpations: Abdomen is soft.      Tenderness: There is no abdominal tenderness.   Musculoskeletal:         General: Normal range of motion.      Cervical back: Neck supple.   Skin:     General: Skin is warm.   Neurological:      Mental Status: He is alert and oriented to person, place, and time. Mental status is at baseline.   Psychiatric:         Mood and Affect: Mood normal.     COVID-negative  Chest x-ray    Omnicef 300 mg twice daily x 10 days  Prednisone 10 mg taper  Albuterol inhaler as directed  Over-the-counter Coricidin    Call or go to ER if anything worsens return in 10 to 14 days if no better otherwise keep  follow-up appointment      Assessment/Plan   Problem List Items Addressed This Visit           ICD-10-CM    Acute bronchitis with bronchospasm - Primary J20.9    Rapid COVID-negative    Chest x-ray  Omnicef 300 mg twice daily x 10 days  Over-the-counter Coricidin cough medication  Prednisone 10 mg taper  Albuterol inhaler as directed    Call or go to ER if anything worsens return in 10 to 14 days if no better         Relevant Medications    cefdinir (Omnicef) 300 mg capsule    predniSONE (Deltasone) 10 mg tablet    albuterol 90 mcg/actuation inhaler    Other Relevant Orders    POCT BinaxNOW Covid-19 Ag Card manually resulted    XR chest 2 views    Acute sinusitis J01.90    Omnicef 300 mg twice daily  Prednisone 10 mg taper         Relevant Medications    cefdinir (Omnicef) 300 mg capsule     predniSONE (Deltasone) 10 mg tablet    Other Relevant Orders    POCT BinaxNOW Covid-19 Ag Card manually resulted

## 2025-04-29 NOTE — TELEPHONE ENCOUNTER
Patient called in to state that [facility] will not cover his medications and that [facility]     He would like to know if you can re [facility].     I changed his pharmacy in his chart

## 2025-04-29 NOTE — TELEPHONE ENCOUNTER
Spoke to pharmacy per [facility] patient gets his medications with their discount program.  I called patient we are unable to [facility] as [facility]goza [facility] not a [facility].  Patient will  prescriptions at [facility]

## 2025-04-30 ENCOUNTER — OFFICE VISIT (OUTPATIENT)
Dept: URGENT CARE | Age: 75
End: 2025-04-30
Payer: MEDICARE

## 2025-04-30 ENCOUNTER — HOSPITAL ENCOUNTER (OUTPATIENT)
Dept: RADIOLOGY | Facility: CLINIC | Age: 75
Discharge: HOME | End: 2025-04-30
Payer: MEDICARE

## 2025-04-30 ENCOUNTER — TELEPHONE (OUTPATIENT)
Dept: URGENT CARE | Age: 75
End: 2025-04-30

## 2025-04-30 VITALS
DIASTOLIC BLOOD PRESSURE: 73 MMHG | RESPIRATION RATE: 18 BRPM | TEMPERATURE: 98.2 F | HEART RATE: 73 BPM | OXYGEN SATURATION: 96 % | SYSTOLIC BLOOD PRESSURE: 140 MMHG

## 2025-04-30 DIAGNOSIS — R09.A2 SENSATION OF FOREIGN BODY IN THROAT: ICD-10-CM

## 2025-04-30 DIAGNOSIS — R09.A2 SENSATION OF FOREIGN BODY IN THROAT: Primary | ICD-10-CM

## 2025-04-30 LAB
POC HUMAN RHINOVIRUS PCR: NEGATIVE
POC INFLUENZA A VIRUS PCR: NEGATIVE
POC INFLUENZA B VIRUS PCR: NEGATIVE
POC RESPIRATORY SYNCYTIAL VIRUS PCR: NEGATIVE
POC STREPTOCOCCUS PYOGENES (GROUP A STREP) PCR: NEGATIVE

## 2025-04-30 PROCEDURE — 99203 OFFICE O/P NEW LOW 30 MIN: CPT

## 2025-04-30 PROCEDURE — 71046 X-RAY EXAM CHEST 2 VIEWS: CPT | Performed by: RADIOLOGY

## 2025-04-30 PROCEDURE — 3078F DIAST BP <80 MM HG: CPT

## 2025-04-30 PROCEDURE — 3077F SYST BP >= 140 MM HG: CPT

## 2025-04-30 PROCEDURE — 4010F ACE/ARB THERAPY RXD/TAKEN: CPT

## 2025-04-30 PROCEDURE — 87631 RESP VIRUS 3-5 TARGETS: CPT

## 2025-04-30 PROCEDURE — 87651 STREP A DNA AMP PROBE: CPT

## 2025-04-30 PROCEDURE — 70360 X-RAY EXAM OF NECK: CPT | Performed by: STUDENT IN AN ORGANIZED HEALTH CARE EDUCATION/TRAINING PROGRAM

## 2025-04-30 PROCEDURE — 1159F MED LIST DOCD IN RCRD: CPT

## 2025-04-30 PROCEDURE — 70360 X-RAY EXAM OF NECK: CPT

## 2025-04-30 PROCEDURE — 1123F ACP DISCUSS/DSCN MKR DOCD: CPT

## 2025-04-30 PROCEDURE — 71046 X-RAY EXAM CHEST 2 VIEWS: CPT

## 2025-04-30 ASSESSMENT — ENCOUNTER SYMPTOMS
CHILLS: 0
PALPITATIONS: 0
CONSTIPATION: 0
NAUSEA: 0
DIARRHEA: 0
SHORTNESS OF BREATH: 0
SINUS PAIN: 0
TROUBLE SWALLOWING: 1
VOICE CHANGE: 0
SORE THROAT: 0
COUGH: 0
FEVER: 0
RHINORRHEA: 0
WHEEZING: 0
SINUS PRESSURE: 0
CHEST TIGHTNESS: 0

## 2025-04-30 NOTE — PATIENT INSTRUCTIONS
Go to  outpatient [facility] to obtain xray imaging    I will call you with results and plan. You do not need to return to our clinic.

## 2025-04-30 NOTE — PROGRESS NOTES
Subjective   Patient ID: Du Herrera is a 74 y.o. male. They present today with a chief complaint of Other (Patient took his metformin last night and it still feels like it is stuck in his throat. ).    History of Present Illness  Patient presents for foreign body sensation in throat. He explains that it started after taking his metformin last night. He explains that he has been able to tolerate fluids and has been drinking tea without relief of the sensation. Denies inability to swallow. Denies fever, chills, sweats. Denies n/v/d. Denies chest pain, sob. Denies recent uri symptoms. He explains that this has happened on one other occasion and resolved on its own. He explains that he has not had solid food since last night because he is nervous to swallow anything other than liquid at this time.           Past Medical History  Allergies as of 04/30/2025    (No Known Allergies)       Prescriptions Prior to Admission[1]     Medical History[2]    Surgical History[3]     reports that he has never smoked. He has been exposed to tobacco smoke. He has never used smokeless tobacco. He reports that he does not currently use alcohol. He reports that he does not use drugs.    Review of Systems  Review of Systems   Constitutional:  Negative for chills and fever.   HENT:  Positive for trouble swallowing. Negative for congestion, ear pain, postnasal drip, rhinorrhea, sinus pressure, sinus pain, sore throat and voice change.    Respiratory:  Negative for cough, chest tightness, shortness of breath and wheezing.    Cardiovascular:  Negative for chest pain and palpitations.   Gastrointestinal:  Negative for constipation, diarrhea and nausea.   Skin:  Negative for rash.                                  Objective    Vitals:    04/30/25 0829   BP: 140/73   Pulse: 73   Resp: 18   Temp: 36.8 °C (98.2 °F)   TempSrc: Oral   SpO2: 96%     No LMP for male patient.    Physical Exam  Constitutional:       General: He is not in acute distress.      Appearance: He is not toxic-appearing.   HENT:      Head: Normocephalic and atraumatic.      Right Ear: External ear normal.      Left Ear: External ear normal.      Nose: No congestion.      Right Sinus: No frontal sinus tenderness.      Left Sinus: No frontal sinus tenderness.      Mouth/Throat:      Mouth: Mucous membranes are moist. No oral lesions.      Pharynx: No oropharyngeal exudate, posterior oropharyngeal erythema or postnasal drip.   Eyes:      Pupils: Pupils are equal, round, and reactive to light.   Cardiovascular:      Rate and Rhythm: Normal rate and regular rhythm.      Pulses: Normal pulses.   Pulmonary:      Effort: Pulmonary effort is normal. No respiratory distress.      Breath sounds: Normal breath sounds. No wheezing.   Musculoskeletal:      Cervical back: Normal range of motion.   Neurological:      Mental Status: He is alert.         Procedures    Point of Care Test & Imaging Results from this visit  Results for orders placed or performed in visit on 04/30/25   POCT SPOTFIRE R/ST Panel Mini w/Strep A ([facility]) manually resulted   Result Value Ref Range    POC Group A Strep, PCR Negative Negative    POC Respiratory Syncytial Virus PCR Negative Negative    POC Influenza A Virus PCR Negative Negative    POC Influenza B Virus PCR Negative Negative    POC Human Rhinovirus PCR Negative Negative      Imaging  XR chest 2 views  Result Date: 4/30/2025  Mild bibasilar atelectasis or infiltrates.   MACRO: None   Signed by: Norberto Quintana 4/30/2025 9:35 AM Dictation workstation:   [id number]      Cardiology, Vascular, and Other Imaging  No other imaging results found for the past 2 days      Diagnostic study results (if any) were reviewed by Kiarra Quijano PA-C.    Assessment/Plan   Allergies, medications, history, and pertinent labs/EKGs/Imaging reviewed by Kiarra Quijano PA-C.     Medical Decision Making  MDM- Patient presents for sensation of foreign body in throat, he is able to tolerate  secretions and fluids by mouth without difficulty. Xray reveals no foreign body on wet read, will review with radiology.  Physical exam benign, negative testing in clinic. At this time will put in referral to gastroenterology for further evaluation. Patient advised to go to ER if symptoms worsen or he is unable to swallow food or water. Patient verbalized understanding and agrees with plan.     Orders and Diagnoses  Diagnoses and all orders for this visit:  Sensation of foreign body in throat  -     POCT SPOTFIRE R/ST Panel Mini w/Strep A ([facility]) manually resulted  -     XR neck soft tissue; Future  -     XR chest 2 views; Future  -     Referral to Gastroenterology; Future      Medical Admin Record      Patient disposition: Home    Electronically signed by Kiarra Quijano PA-C  12:10 PM           [1] (Not in a hospital admission)   [2]   Past Medical History:  Diagnosis Date    Diabetes mellitus (Multi)     Hypertension    [3]   Past Surgical History:  Procedure Laterality Date    COLONOSCOPY  08/12/2015    Complete Colonoscopy    OTHER SURGICAL HISTORY  08/22/2022    No history of surgery

## 2025-07-22 DIAGNOSIS — E11.9 TYPE 2 DIABETES MELLITUS WITHOUT COMPLICATION, WITHOUT LONG-TERM CURRENT USE OF INSULIN: ICD-10-CM

## 2025-07-22 DIAGNOSIS — E61.1 IRON DEFICIENCY: ICD-10-CM

## 2025-07-22 DIAGNOSIS — M10.00 IDIOPATHIC GOUT, UNSPECIFIED CHRONICITY, UNSPECIFIED SITE: ICD-10-CM

## 2025-07-22 DIAGNOSIS — E78.2 MIXED HYPERLIPIDEMIA: ICD-10-CM

## 2025-07-22 DIAGNOSIS — E53.8 B12 DEFICIENCY: ICD-10-CM

## 2025-07-22 DIAGNOSIS — E55.9 VITAMIN D DEFICIENCY: ICD-10-CM

## 2025-07-22 DIAGNOSIS — I10 BENIGN ESSENTIAL HYPERTENSION: ICD-10-CM

## 2025-08-20 ENCOUNTER — APPOINTMENT (OUTPATIENT)
Dept: PRIMARY CARE | Facility: CLINIC | Age: 75
End: 2025-08-20
Payer: MEDICARE

## 2025-08-20 VITALS
TEMPERATURE: 96.7 F | DIASTOLIC BLOOD PRESSURE: 82 MMHG | RESPIRATION RATE: 14 BRPM | WEIGHT: 264.4 LBS | HEART RATE: 57 BPM | HEIGHT: 68 IN | BODY MASS INDEX: 40.07 KG/M2 | SYSTOLIC BLOOD PRESSURE: 136 MMHG | OXYGEN SATURATION: 97 %

## 2025-08-20 DIAGNOSIS — Z12.5 SCREENING FOR PROSTATE CANCER: ICD-10-CM

## 2025-08-20 DIAGNOSIS — I10 BENIGN ESSENTIAL HYPERTENSION: ICD-10-CM

## 2025-08-20 DIAGNOSIS — E78.00 PURE HYPERCHOLESTEROLEMIA: ICD-10-CM

## 2025-08-20 DIAGNOSIS — J20.9 ACUTE BRONCHITIS WITH BRONCHOSPASM: ICD-10-CM

## 2025-08-20 DIAGNOSIS — E53.8 B12 DEFICIENCY: ICD-10-CM

## 2025-08-20 DIAGNOSIS — M10.00 IDIOPATHIC GOUT, UNSPECIFIED CHRONICITY, UNSPECIFIED SITE: ICD-10-CM

## 2025-08-20 DIAGNOSIS — E61.1 IRON DEFICIENCY: ICD-10-CM

## 2025-08-20 DIAGNOSIS — E11.9 TYPE 2 DIABETES MELLITUS WITHOUT COMPLICATION, WITHOUT LONG-TERM CURRENT USE OF INSULIN: Primary | ICD-10-CM

## 2025-08-20 DIAGNOSIS — R53.83 OTHER FATIGUE: ICD-10-CM

## 2025-08-20 DIAGNOSIS — E78.2 MIXED HYPERLIPIDEMIA: ICD-10-CM

## 2025-08-20 PROBLEM — J01.90 ACUTE SINUSITIS: Status: RESOLVED | Noted: 2025-04-29 | Resolved: 2025-08-20

## 2025-08-20 PROBLEM — Z13.29 THYROID DISORDER SCREEN: Status: RESOLVED | Noted: 2024-10-03 | Resolved: 2025-08-20

## 2025-08-20 PROCEDURE — 1159F MED LIST DOCD IN RCRD: CPT | Performed by: FAMILY MEDICINE

## 2025-08-20 PROCEDURE — 1036F TOBACCO NON-USER: CPT | Performed by: FAMILY MEDICINE

## 2025-08-20 PROCEDURE — G2211 COMPLEX E/M VISIT ADD ON: HCPCS | Performed by: FAMILY MEDICINE

## 2025-08-20 PROCEDURE — 1160F RVW MEDS BY RX/DR IN RCRD: CPT | Performed by: FAMILY MEDICINE

## 2025-08-20 PROCEDURE — 3075F SYST BP GE 130 - 139MM HG: CPT | Performed by: FAMILY MEDICINE

## 2025-08-20 PROCEDURE — 99214 OFFICE O/P EST MOD 30 MIN: CPT | Performed by: FAMILY MEDICINE

## 2025-08-20 PROCEDURE — 3079F DIAST BP 80-89 MM HG: CPT | Performed by: FAMILY MEDICINE

## 2025-08-20 RX ORDER — FLUTICASONE PROPIONATE 50 MCG
SPRAY, SUSPENSION (ML) NASAL
COMMUNITY
Start: 2025-05-02

## 2025-08-20 RX ORDER — CHOLECALCIFEROL (VITAMIN D3) 25 MCG
25 TABLET ORAL DAILY
COMMUNITY

## 2025-08-20 RX ORDER — LISINOPRIL 10 MG/1
10 TABLET ORAL DAILY
Qty: 30 TABLET | Refills: 5 | Status: CANCELLED | OUTPATIENT
Start: 2025-08-20 | End: 2026-02-16

## 2025-08-20 RX ORDER — ROSUVASTATIN CALCIUM 20 MG/1
20 TABLET, COATED ORAL DAILY
Qty: 30 TABLET | Refills: 5 | Status: SHIPPED | OUTPATIENT
Start: 2025-08-20 | End: 2026-02-16

## 2025-08-20 ASSESSMENT — ENCOUNTER SYMPTOMS
ARTHRALGIAS: 0
SHORTNESS OF BREATH: 0
PALPITATIONS: 0
FEVER: 0
CHILLS: 0
CONFUSION: 0
ABDOMINAL PAIN: 0
CHEST TIGHTNESS: 0

## 2025-08-20 ASSESSMENT — PATIENT HEALTH QUESTIONNAIRE - PHQ9
2. FEELING DOWN, DEPRESSED OR HOPELESS: NOT AT ALL
SUM OF ALL RESPONSES TO PHQ9 QUESTIONS 1 AND 2: 0
1. LITTLE INTEREST OR PLEASURE IN DOING THINGS: NOT AT ALL

## 2025-08-21 LAB
25(OH)D3+25(OH)D2 SERPL-MCNC: 58 NG/ML (ref 30–100)
ALBUMIN SERPL-MCNC: 4.6 G/DL (ref 3.6–5.1)
ALP SERPL-CCNC: 57 U/L (ref 35–144)
ALT SERPL-CCNC: 15 U/L (ref 9–46)
ANION GAP SERPL CALCULATED.4IONS-SCNC: 11 MMOL/L (CALC) (ref 7–17)
AST SERPL-CCNC: 19 U/L (ref 10–35)
BILIRUB SERPL-MCNC: 0.5 MG/DL (ref 0.2–1.2)
BUN SERPL-MCNC: 11 MG/DL (ref 7–25)
CALCIUM SERPL-MCNC: 9.3 MG/DL (ref 8.6–10.3)
CHLORIDE SERPL-SCNC: 102 MMOL/L (ref 98–110)
CHOLEST SERPL-MCNC: 166 MG/DL
CHOLEST/HDLC SERPL: 3.7 (CALC)
CO2 SERPL-SCNC: 28 MMOL/L (ref 20–32)
CREAT SERPL-MCNC: 0.95 MG/DL (ref 0.7–1.28)
EGFRCR SERPLBLD CKD-EPI 2021: 83 ML/MIN/1.73M2
ERYTHROCYTE [DISTWIDTH] IN BLOOD BY AUTOMATED COUNT: 15.2 % (ref 11–15)
EST. AVERAGE GLUCOSE BLD GHB EST-MCNC: 123 MG/DL
EST. AVERAGE GLUCOSE BLD GHB EST-SCNC: 6.8 MMOL/L
FERRITIN SERPL-MCNC: 310 NG/ML (ref 24–380)
GLUCOSE SERPL-MCNC: 91 MG/DL (ref 65–99)
HBA1C MFR BLD: 5.9 %
HCT VFR BLD AUTO: 41.5 % (ref 38.5–50)
HDLC SERPL-MCNC: 45 MG/DL
HGB BLD-MCNC: 13.3 G/DL (ref 13.2–17.1)
IRON SERPL-MCNC: 73 MCG/DL (ref 50–180)
LDLC SERPL CALC-MCNC: 102 MG/DL (CALC)
MCH RBC QN AUTO: 26.4 PG (ref 27–33)
MCHC RBC AUTO-ENTMCNC: 32 G/DL (ref 32–36)
MCV RBC AUTO: 82.5 FL (ref 80–100)
NONHDLC SERPL-MCNC: 121 MG/DL (CALC)
PLATELET # BLD AUTO: 195 THOUSAND/UL (ref 140–400)
PMV BLD REES-ECKER: 10.6 FL (ref 7.5–12.5)
POTASSIUM SERPL-SCNC: 3.6 MMOL/L (ref 3.5–5.3)
PROT SERPL-MCNC: 8.1 G/DL (ref 6.1–8.1)
RBC # BLD AUTO: 5.03 MILLION/UL (ref 4.2–5.8)
SODIUM SERPL-SCNC: 141 MMOL/L (ref 135–146)
TRIGL SERPL-MCNC: 92 MG/DL
URATE SERPL-MCNC: 7 MG/DL (ref 4–8)
VIT B12 SERPL-MCNC: 1879 PG/ML (ref 200–1100)
WBC # BLD AUTO: 3.3 THOUSAND/UL (ref 3.8–10.8)

## 2025-12-22 ENCOUNTER — APPOINTMENT (OUTPATIENT)
Dept: PRIMARY CARE | Facility: CLINIC | Age: 75
End: 2025-12-22
Payer: MEDICARE